# Patient Record
Sex: MALE | Race: WHITE | NOT HISPANIC OR LATINO | Employment: OTHER | URBAN - METROPOLITAN AREA
[De-identification: names, ages, dates, MRNs, and addresses within clinical notes are randomized per-mention and may not be internally consistent; named-entity substitution may affect disease eponyms.]

---

## 2017-01-11 ENCOUNTER — TELEPHONE (OUTPATIENT)
Dept: OTHER | Facility: CLINIC | Age: 72
End: 2017-01-11

## 2017-01-11 NOTE — TELEPHONE ENCOUNTER
What??? The [facility] Department of Rheumatology is NOT accepting new patients???? That sounds completely incorrect. Please call the scheduling department at the [facility] Rheumatology department to verify why the patient was told this.......

## 2017-01-11 NOTE — TELEPHONE ENCOUNTER
Patient states that when he called to schedule his Rheumatology appointment they stated that Rheum ([facility]) unable to accept new patients.  He needs another referral to a different Rheumatologist.  Please advise  Mica Keys RN, BSN

## 2017-01-11 NOTE — TELEPHONE ENCOUNTER
Called [facility] main referral line: [phone]  Patient's [facility] ID# [id number]  Fax sent to [facility] at , confirmed delivery via right fax at [phone]  May take up to seven days for approval or denial.  They will contact the patient if he is approved to go to [facility] and will contact the office if he is denied.  Patient updated  Mica Keys RN, BSN

## 2017-01-11 NOTE — TELEPHONE ENCOUNTER
I called and verified.  They are only accepting for scleroderma and lups right now.  Please advise

## 2017-01-13 ENCOUNTER — TELEPHONE (OUTPATIENT)
Dept: OTHER | Facility: CLINIC | Age: 72
End: 2017-01-13

## 2017-01-13 NOTE — TELEPHONE ENCOUNTER
Pt scheduled at [facility] on March 6th.  He wants to know if he should see PCP after that appointment or keep his February 1st appointment?  Please advise  Mica Keys, RN, BSN

## 2017-01-25 ENCOUNTER — HOSPITAL ENCOUNTER (OUTPATIENT)
Dept: LAB | Facility: CLINIC | Age: 72
Discharge: HOME | End: 2017-01-25
Attending: INTERNAL MEDICINE | Admitting: INTERNAL MEDICINE
Payer: MEDICARE

## 2017-01-25 DIAGNOSIS — I77.6 AORTITIS (H): ICD-10-CM

## 2017-01-25 LAB
CRP SERPL-MCNC: <2.9 MG/L (ref 0–8)
ERYTHROCYTE [SEDIMENTATION RATE] IN BLOOD BY WESTERGREN METHOD: 7 MM/H (ref 0–20)

## 2017-01-25 PROCEDURE — 36415 COLL VENOUS BLD VENIPUNCTURE: CPT | Performed by: INTERNAL MEDICINE

## 2017-01-25 PROCEDURE — 86140 C-REACTIVE PROTEIN: CPT | Performed by: INTERNAL MEDICINE

## 2017-01-25 PROCEDURE — 85652 RBC SED RATE AUTOMATED: CPT | Performed by: INTERNAL MEDICINE

## 2017-01-25 PROCEDURE — 86038 ANTINUCLEAR ANTIBODIES: CPT | Performed by: INTERNAL MEDICINE

## 2017-01-26 LAB — ANA SER QL IA: NORMAL

## 2017-02-09 ENCOUNTER — TELEPHONE (OUTPATIENT)
Dept: OTHER | Facility: CLINIC | Age: 72
End: 2017-02-09

## 2017-02-09 NOTE — TELEPHONE ENCOUNTER
Medication from patient assistance program delivered here.  Patient notified and will  tomorrow.  Mica Keys RN, BSN

## 2017-02-14 DIAGNOSIS — Z01.818 PRE-OPERATIVE EXAMINATION: Primary | ICD-10-CM

## 2017-02-14 PROCEDURE — 93010 ELECTROCARDIOGRAM REPORT: CPT | Mod: ZP | Performed by: INTERNAL MEDICINE

## 2017-02-15 ENCOUNTER — OFFICE VISIT (OUTPATIENT)
Dept: OTHER | Facility: CLINIC | Age: 72
End: 2017-02-15
Attending: INTERNAL MEDICINE
Payer: MEDICARE

## 2017-02-15 ENCOUNTER — HOSPITAL ENCOUNTER (OUTPATIENT)
Dept: LAB | Facility: CLINIC | Age: 72
Discharge: HOME | End: 2017-02-15
Attending: INTERNAL MEDICINE | Admitting: INTERNAL MEDICINE
Payer: MEDICARE

## 2017-02-15 VITALS
SYSTOLIC BLOOD PRESSURE: 135 MMHG | WEIGHT: 226 LBS | HEIGHT: 74 IN | BODY MASS INDEX: 29 KG/M2 | DIASTOLIC BLOOD PRESSURE: 78 MMHG

## 2017-02-15 DIAGNOSIS — Z72.0 TOBACCO ABUSE: ICD-10-CM

## 2017-02-15 DIAGNOSIS — Z01.818 PREOP GENERAL PHYSICAL EXAM: Primary | ICD-10-CM

## 2017-02-15 DIAGNOSIS — I10 ESSENTIAL HYPERTENSION: ICD-10-CM

## 2017-02-15 DIAGNOSIS — Z01.818 PRE-OPERATIVE EXAMINATION: ICD-10-CM

## 2017-02-15 LAB
APTT PPP: 34 SEC (ref 22–37)
HGB BLD-MCNC: 15.8 G/DL (ref 13.3–17.7)
INR PPP: 0.89 (ref 0.86–1.14)

## 2017-02-15 PROCEDURE — 99214 OFFICE O/P EST MOD 30 MIN: CPT | Mod: ZP | Performed by: INTERNAL MEDICINE

## 2017-02-15 PROCEDURE — 85730 THROMBOPLASTIN TIME PARTIAL: CPT | Performed by: INTERNAL MEDICINE

## 2017-02-15 PROCEDURE — 93010 ELECTROCARDIOGRAM REPORT: CPT | Mod: ZP | Performed by: INTERNAL MEDICINE

## 2017-02-15 PROCEDURE — 99211 OFF/OP EST MAY X REQ PHY/QHP: CPT

## 2017-02-15 PROCEDURE — 85610 PROTHROMBIN TIME: CPT | Performed by: INTERNAL MEDICINE

## 2017-02-15 PROCEDURE — 36415 COLL VENOUS BLD VENIPUNCTURE: CPT | Performed by: INTERNAL MEDICINE

## 2017-02-15 PROCEDURE — 85018 HEMOGLOBIN: CPT | Performed by: INTERNAL MEDICINE

## 2017-02-15 RX ORDER — LOSARTAN POTASSIUM 100 MG/1
100 TABLET ORAL DAILY
Qty: 90 TABLET | Refills: 3 | Status: SHIPPED | OUTPATIENT
Start: 2017-02-15 | End: 2017-06-06

## 2017-02-15 NOTE — MR AVS SNAPSHOT
After Visit Summary   2/15/2017    Kaleb Pennington    MRN: [id number]           Patient Information     Date Of Birth          1945        Visit Information        Provider Department      2/15/2017 2:00 PM Christopher Henry MD [facility]  [facility]      Today's Diagnoses     Preop general physical exam    -  1    Tobacco abuse        Essential hypertension          Care Instructions      Before Your Surgery      Call your surgeon if there is any change in your health. This includes signs of a cold or flu (such as a sore throat, runny nose, cough, rash or fever).    Do not smoke, drink alcohol or take over the counter medicine (unless your surgeon or primary care doctor tells you to) for the 24 hours before and after surgery.    If you take prescribed drugs: Follow your doctor s orders about which medicines to take and which to stop until after surgery.    Eating and drinking prior to surgery: follow the instructions from your surgeon    Take a shower or bath the night before surgery. Use the soap your surgeon gave you to gently clean your skin. If you do not have soap from your surgeon, use your regular soap. Do not shave or scrub the surgery site.  Wear clean pajamas and have clean sheets on your bed.         Follow-ups after your visit        Your next 10 appointments already scheduled     Feb 22, 2017   Procedure with Crystal Humphrey MD   [facility] (--)    [address]  [facility] [address]   [phone]            Mar 20, 2017 11:00 AM CDT   Return Visit with Christopher Henry MD   [facility] [place] [facility])    [address]  [facility] [address]   [phone]              Future tests that were ordered for you today     Open Future Orders        Priority Expected Expires Ordered    Partial  "thromboplastin time Routine 2017 3/14/2017 2017    INR Routine 2017 3/14/2017 2017    Hemoglobin Routine 2017 3/14/2017 2017            Who to contact     If you have questions or need follow up information about today's clinic visit or your schedule please contact [facility] directly at [phone].  Normal or non-critical lab and imaging results will be communicated to you by Offermaticahart, letter or phone within 4 business days after the clinic has received the results. If you do not hear from us within 7 days, please contact the clinic through [employer] or phone. If you have a critical or abnormal lab result, we will notify you by phone as soon as possible.  Submit refill requests through Toopher or call your pharmacy and they will forward the refill request to us. Please allow 3 business days for your refill to be completed.          Additional Information About Your Visit        OffermaticaharGate [employer] Information     Toopher lets you send messages to your doctor, view your test results, renew your prescriptions, schedule appointments and more. To sign up, go to [email or web address] . Click on \"Log in\" on the left side of the screen, which will take you to the Welcome page. Then click on \"Sign up Now\" on the right side of the page.     You will be asked to enter the access code listed below, as well as some personal information. Please follow the directions to create your username and password.     Your access code is: [email or web address]  Expires: 3/21/2017 10:58 AM     Your access code will  in 90 days. If you need help or a new code, please call your [place] clinic or [phone].        Care EveryWhere ID     This is your Care EveryWhere ID. This could be used by other organizations to access your [place] medical records  [id number]        Your Vitals Were     Height BMI (Body Mass Index)                6' 2\" (1.88 m) 29.02 kg/m2           Blood Pressure from Last 3 " Encounters:   02/15/17 135/78   12/21/16 172/80   09/14/16 128/75    Weight from Last 3 Encounters:   02/15/17 226 lb (102.5 kg)   12/21/16 215 lb (97.5 kg)   09/14/16 198 lb 3.2 oz (89.9 kg)              We Performed the Following     EKG 12-lead complete w/read - Clinics     HC SMOKING CESSATION COUNSELING, ASYMPTOMATIC, 3-10 MIN          Today's Medication Changes          These changes are accurate as of: 2/15/17  2:32 PM.  If you have any questions, ask your nurse or doctor.               These medicines have changed or have updated prescriptions.        Dose/Directions    losartan 100 MG tablet   Commonly known as:  COZAAR   This may have changed:    - medication strength  - how much to take   Used for:  Essential hypertension   Changed by:  Christopher Henry MD        Dose:  100 mg   Take 1 tablet (100 mg) by mouth daily   Quantity:  90 tablet   Refills:  3            Where to get your medicines      These medications were sent to [id number] - [place], [place] - [address] [facility] [address]  [address] [facility] [address], [facility] [address]     Phone:  [phone]     losartan 100 MG tablet                Primary Care Provider Office Phone # Fax #    Christopher Henry [phone]       [place] [facility] [address]  [facility] [address]        Thank you!     Thank you for choosing [facility] [place]  for your care. Our goal is always to provide you with excellent care. Hearing back from our patients is one way we can continue to improve our services. Please take a few minutes to complete the written survey that you may receive in the mail after your visit with us. Thank you!             Your Updated Medication List - Protect others around you: Learn how to safely use, store and throw away your medicines at www.disposemymeds.org.          This list is accurate as of: 2/15/17  2:32 PM.  Always use your most recent med list.                   Brand Name Dispense Instructions for  use    losartan 100 MG tablet    COZAAR    90 tablet    Take 1 tablet (100 mg) by mouth daily       metoprolol 25 MG tablet    LOPRESSOR    60 tablet    Take 1 tablet (25 mg) by mouth 2 times daily       predniSONE 10 MG tablet    DELTASONE    150 tablet    Take 1 tablet (10 mg) by mouth daily       * varenicline 0.5 MG X 11 & 1 MG X 42 tablet    CHANTIX STARTING MONTH VIET    53 tablet    Take 0.5 mg tab daily for 3 days, then 0.5 mg tab twice daily for 4 days, then 1 mg twice daily.       * varenicline 1 MG tablet    CHANTIX    56 tablet    Take 1 tablet (1 mg) by mouth 2 times daily       * varenicline 1 MG tablet    CHANTIX    180 tablet    Take 1 tablet (1 mg) by mouth 2 times daily       * varenicline 0.5 MG X 11 & 1 MG X 42 tablet    CHANTIX STARTING MONTH VIET    53 tablet    Take 0.5 mg tab daily for 3 days, then 0.5 mg tab twice daily for 4 days, then 1 mg twice daily.       * Notice:  This list has 4 medication(s) that are the same as other medications prescribed for you. Read the directions carefully, and ask your doctor or other care provider to review them with you.

## 2017-02-15 NOTE — PROGRESS NOTES
[facility]  [address]  [place] [address]  [phone]  Dept: [phone]    PRE-OP EVALUATION:  Today's date: 2/15/2017    Kaleb Pennington (: 1945) presents for pre-operative evaluation assessment as requested by Dr. Humphrey.  He requires evaluation and anesthesia risk assessment prior to undergoing surgery/procedure for treatment of a pancreatic mass .  Proposed procedure: EUS     Date of Surgery/ Procedure: 17  Time of Surgery/ Procedure: [facility]  Hospital/Surgical Facility: [facility]  Primary Physician: Christopher Henry  Type of Anesthesia Anticipated: Local with MAC    Patient has a Health Care Directive or Living Will:  YES Pt is full code        HPI:                                                      Brief HPI related to upcoming procedure: Pt was incidentally discovered to have a pancreatic mass. EUS is being undertaken to asisst in in definitive diagnosis      See problem list for active medical problems.  Problems all longstanding and stable, except as noted/documented.  See ROS for pertinent symptoms related to these conditions.                                                                                                  .    MEDICAL HISTORY:                                                    There are no active problems to display for this patient.     No past medical history on file.  Past Surgical History   Procedure Laterality Date     Orthopedic surgery       Current Outpatient Prescriptions   Medication Sig Dispense Refill     predniSONE (DELTASONE) 10 MG tablet Take 1 tablet (10 mg) by mouth daily 150 tablet 0     varenicline (CHANTIX) 1 MG tablet Take 1 tablet (1 mg) by mouth 2 times daily 180 tablet 0     losartan (COZAAR) 50 MG tablet Take 1 tablet (50 mg) by mouth daily 30 tablet 1     metoprolol (LOPRESSOR) 25 MG tablet Take 1 tablet (25 mg) by mouth 2 times daily 60 tablet 1     varenicline (CHANTIX STARTING MONTH )  "0.5 MG X 11 & 1 MG X 42 tablet Take 0.5 mg tab daily for 3 days, then 0.5 mg tab twice daily for 4 days, then 1 mg twice daily. 53 tablet 0     varenicline (CHANTIX STARTING MONTH VIET) 0.5 MG X 11 & 1 MG X 42 tablet Take 0.5 mg tab daily for 3 days, then 0.5 mg tab twice daily for 4 days, then 1 mg twice daily. 53 tablet 0     varenicline (CHANTIX) 1 MG tablet Take 1 tablet (1 mg) by mouth 2 times daily 56 tablet 2     OTC products: none    Allergies   Allergen Reactions     Bee Venom Anaphylaxis      Latex Allergy: NO    Social History   Substance Use Topics     Smoking status: Light Tobacco Smoker     Packs/day: 1.50     Years: 50.00     Types: Cigarettes     Smokeless tobacco: Not on file     Alcohol use No     History   Drug Use No       REVIEW OF SYSTEMS:                                                    C: NEGATIVE for fever, chills, change in weight  I: NEGATIVE for worrisome rashes, moles or lesions  E: NEGATIVE for vision changes or irritation  E/M: NEGATIVE for ear, mouth and throat problems  R: NEGATIVE for significant cough or SOB  B: NEGATIVE for masses, tenderness or discharge  CV: NEGATIVE for chest pain, palpitations or peripheral edema  GI: NEGATIVE for nausea, abdominal pain, heartburn, or change in bowel habits  : NEGATIVE for frequency, dysuria, or hematuria  MUSCULOSKELETAL:POSITIVE  for arthralgias in back and legs asosciated with aortitis  N: NEGATIVE for weakness, dizziness or paresthesias  E: NEGATIVE for temperature intolerance, skin/hair changes  H: NEGATIVE for bleeding problems  P: NEGATIVE for changes in mood or affect    EXAM:                                                    /78 (BP Location: Left arm, Patient Position: Chair, Cuff Size: Adult Regular)  Ht 6' 2\" (1.88 m)  Wt 226 lb (102.5 kg)  BMI 29.02 kg/m2    GENERAL APPEARANCE: healthy, alert and no distress     EYES: EOMI, - PERRL     HENT: ear canals and TM's normal and nose and mouth without ulcers or lesions     " NECK: no adenopathy, no asymmetry, masses, or scars and thyroid normal to palpation     RESP: lungs clear to auscultation - no rales, rhonchi or wheezes     CV: regular rates and rhythm, normal S1 S2, no S3 or S4 and no murmur, click or rub -     ABDOMEN:  soft, nontender, no HSM or masses and bowel sounds normal     MS: extremities normal- no gross deformities noted, no evidence of inflammation in joints, FROM in all extremities.     SKIN: no suspicious lesions or rashes     NEURO: Normal strength and tone, sensory exam grossly normal, mentation intact and speech normal     PSYCH: mentation appears normal. and affect normal/bright     LYMPHATICS: No axillary, cervical, inguinal, or supraclavicular nodes    DIAGNOSTICS:                                                    EKG: Normal Sinus Rhythm, LBBB, no LVH by voltage criteria  Coagulation Studies (e.g. INR, PTT, platelet count)    Recent Labs   Lab Test  02/15/17   1320  04/28/16   1204   HGB  15.8  13.7   PLT   --   386   INR  0.89   --    A1C   --   5.8        IMPRESSION:                                                    Reason for surgery/procedure: pancreatic mass  Diagnosis/reason for consult: aortitis    The proposed surgical procedure is considered LOW risk.    REVISED CARDIAC RISK INDEX  The patient has the following serious cardiovascular risks for perioperative complications such as (MI, PE, VFib and 3  AV Block):  No serious cardiac risks  INTERPRETATION: 0 risks: Class I (very low risk - 0.4% complication rate)    The patient has the following additional risks for perioperative complications:  No identified additional risks      ICD-10-CM    1. Preop general physical exam Z01.818 EKG 12-lead complete w/read - Clinics   2. Tobacco abuse Z72.0  SMOKING CESSATION COUNSELING, ASYMPTOMATIC, 3-10 MIN   3. Essential hypertension I10 losartan (COZAAR) 100 MG tablet       RECOMMENDATIONS:                                                      --Consult  hospital rounder / IM to assist post-op medical management    --Patient is to take all scheduled medications on the day of surgery.    --Losartan is increased from 50 to 100 mg daily due to persistent htn.        APPROVAL GIVEN to proceed with proposed procedure, without further diagnostic evaluation       Signed Electronically by: Christopher Henry MD    Copy of this evaluation report is provided to requesting physician.    [facility] Preop Guidelines

## 2017-02-15 NOTE — NURSING NOTE
"Chief Complaint   Patient presents with     Consult     pre-op; pt scheduled for an endoscopy 2/22/17       Initial /82 (BP Location: Left arm, Patient Position: Chair, Cuff Size: Adult Regular)  Ht 6' 2\" (1.88 m)  Wt 226 lb (102.5 kg)  BMI 29.02 kg/m2 Estimated body mass index is 29.02 kg/(m^2) as calculated from the following:    Height as of this encounter: 6' 2\" (1.88 m).    Weight as of this encounter: 226 lb (102.5 kg).  Medication Reconciliation: complete     Face to face nursing time: 10 minutes    Roxane Helms MA        "

## 2017-02-20 ENCOUNTER — TELEPHONE (OUTPATIENT)
Dept: OTHER | Facility: CLINIC | Age: 72
End: 2017-02-20

## 2017-02-20 NOTE — TELEPHONE ENCOUNTER
Pt would like to have an ordered for a hospital stay after procedure as he states that his insurance.  He would also like to know if a MRCP would be as good of a test then what he is scheduled for.  Please advise  Mica Keys RN, BSN

## 2017-02-20 NOTE — TELEPHONE ENCOUNTER
Patient will let RN know if order is needed for test or if current MD preforming procedure will order.  Awaiting return call.  Mica Keys, RN, BSN

## 2017-02-22 ENCOUNTER — TELEPHONE (OUTPATIENT)
Dept: OTHER | Facility: CLINIC | Age: 72
End: 2017-02-22

## 2017-02-22 DIAGNOSIS — F40.240 CLAUSTROPHOBIA: Primary | ICD-10-CM

## 2017-02-22 RX ORDER — LORAZEPAM 1 MG/1
TABLET ORAL
Qty: 1 TABLET | Refills: 0 | Status: SHIPPED | OUTPATIENT
Start: 2017-02-22 | End: 2017-06-06

## 2017-02-22 NOTE — TELEPHONE ENCOUNTER
Patient called, states understanding  Faxed RX February 22, 2017 to fax number     Right Fax confirmed at [phone]   Oma Keys

## 2017-02-22 NOTE — TELEPHONE ENCOUNTER
Pt called with [facility] that MRCP is scheduled for 3/3/17 at 1pm. Voice concerns about feeling claustrophobic in machine. Wondering if he should take a medication before procedure.     Mica Keys, RN, BSN

## 2017-03-03 ENCOUNTER — HOSPITAL ENCOUNTER (OUTPATIENT)
Dept: MRI IMAGING | Facility: CLINIC | Age: 72
Discharge: HOME | End: 2017-03-03
Attending: INTERNAL MEDICINE | Admitting: INTERNAL MEDICINE
Payer: MEDICARE

## 2017-03-03 DIAGNOSIS — K86.2 PANCREAS CYST: ICD-10-CM

## 2017-03-03 LAB
CREAT BLD-MCNC: 1 MG/DL (ref 0.66–1.25)
GFR SERPL CREATININE-BSD FRML MDRD: 73 ML/MIN/1.7M2

## 2017-03-03 PROCEDURE — 74183 MRI ABD W/O CNTR FLWD CNTR: CPT

## 2017-03-03 PROCEDURE — 82565 ASSAY OF CREATININE: CPT

## 2017-03-03 PROCEDURE — A9585 GADOBUTROL INJECTION: HCPCS | Performed by: INTERNAL MEDICINE

## 2017-03-03 PROCEDURE — 25500064 ZZH RX 255 OP 636: Performed by: INTERNAL MEDICINE

## 2017-03-03 PROCEDURE — 27210995 ZZH RX 272: Performed by: INTERNAL MEDICINE

## 2017-03-03 RX ORDER — ACYCLOVIR 200 MG/1
60 CAPSULE ORAL ONCE
Status: COMPLETED | OUTPATIENT
Start: 2017-03-03 | End: 2017-03-03

## 2017-03-03 RX ORDER — GADOBUTROL 604.72 MG/ML
15 INJECTION INTRAVENOUS ONCE
Status: COMPLETED | OUTPATIENT
Start: 2017-03-03 | End: 2017-03-03

## 2017-03-03 RX ADMIN — SODIUM CHLORIDE 60 ML: 9 INJECTION, SOLUTION INTRAMUSCULAR; INTRAVENOUS; SUBCUTANEOUS at 14:30

## 2017-03-03 RX ADMIN — GADOBUTROL 15 ML: 604.72 INJECTION INTRAVENOUS at 14:29

## 2017-03-08 ENCOUNTER — TRANSFERRED RECORDS (OUTPATIENT)
Dept: HEALTH INFORMATION MANAGEMENT | Facility: CLINIC | Age: 72
End: 2017-03-08

## 2017-03-13 ENCOUNTER — TRANSFERRED RECORDS (OUTPATIENT)
Dept: HEALTH INFORMATION MANAGEMENT | Facility: CLINIC | Age: 72
End: 2017-03-13

## 2017-03-17 ENCOUNTER — TRANSFERRED RECORDS (OUTPATIENT)
Dept: HEALTH INFORMATION MANAGEMENT | Facility: CLINIC | Age: 72
End: 2017-03-17

## 2017-03-17 ENCOUNTER — MEDICAL CORRESPONDENCE (OUTPATIENT)
Dept: HEALTH INFORMATION MANAGEMENT | Facility: CLINIC | Age: 72
End: 2017-03-17

## 2017-03-20 ENCOUNTER — TELEPHONE (OUTPATIENT)
Dept: OTHER | Facility: CLINIC | Age: 72
End: 2017-03-20

## 2017-03-20 NOTE — TELEPHONE ENCOUNTER
Patient would like to know if meds Rx'd by [facility] are ok with Dr. Henry and if he should continue taking all meds Rx'd by PCP  New list of meds from [facility] doc placed in Rm 12  Please advise  Mica Keys, YG, BSN

## 2017-04-04 ENCOUNTER — OFFICE VISIT (OUTPATIENT)
Dept: OTHER | Facility: CLINIC | Age: 72
End: 2017-04-04
Attending: INTERNAL MEDICINE
Payer: COMMERCIAL

## 2017-04-04 VITALS
HEART RATE: 77 BPM | DIASTOLIC BLOOD PRESSURE: 69 MMHG | WEIGHT: 231.4 LBS | BODY MASS INDEX: 29.71 KG/M2 | SYSTOLIC BLOOD PRESSURE: 128 MMHG

## 2017-04-04 DIAGNOSIS — R01.1 HEART MURMUR, SYSTOLIC: ICD-10-CM

## 2017-04-04 DIAGNOSIS — Z00.00 ROUTINE GENERAL MEDICAL EXAMINATION AT A HEALTH CARE FACILITY: ICD-10-CM

## 2017-04-04 DIAGNOSIS — R09.89 BRUIT OF RIGHT CAROTID ARTERY: ICD-10-CM

## 2017-04-04 DIAGNOSIS — K86.89 PANCREATIC MASS: Primary | ICD-10-CM

## 2017-04-04 DIAGNOSIS — I71.40 ABDOMINAL AORTIC ANEURYSM WITHOUT RUPTURE (H): ICD-10-CM

## 2017-04-04 DIAGNOSIS — I71.21 ASCENDING AORTIC ANEURYSM (H): ICD-10-CM
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
## 2017-04-04 DIAGNOSIS — Z72.0 TOBACCO ABUSE: ICD-10-CM

## 2017-04-04 PROCEDURE — 99215 OFFICE O/P EST HI 40 MIN: CPT | Mod: ZP | Performed by: INTERNAL MEDICINE

## 2017-04-04 PROCEDURE — 99211 OFF/OP EST MAY X REQ PHY/QHP: CPT

## 2017-04-04 PROCEDURE — 99406 BEHAV CHNG SMOKING 3-10 MIN: CPT | Mod: ZP | Performed by: INTERNAL MEDICINE

## 2017-04-04 PROCEDURE — 40000809 ZZH STATISTIC NO DOCUMENTATION TO SUPPORT CHARGE

## 2017-04-04 NOTE — MR AVS SNAPSHOT
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)        Tobacco abuse        Heart murmur, systolic, secondary to aortic sclerosis        Bruit of right carotid artery        Routine general medical examination at a health care facility        Wood tick bites           Follow-ups after your visit        Additional Services     Follow-Up with Vascular Medicine                 Your next 10 appointments already scheduled     May 30, 2017 10:30 AM CDT   US CAROTID BILATERAL with US5   [facility] ([facility])    [address] [facility] [address] [phone]           Please bring a list of your medicines (including vitamins, minerals and over-the-counter drugs). Also, tell your doctor about any allergies you may have. Wear comfortable clothes and leave your valuables at home.  You do not need to do anything special to prepare for your exam.  Please call the Imaging Department at your exam site with any questions.            Jun 06, 2017 10:30 AM CDT   Return Visit with Christopher Henry MD   [facility] ([facility])    [address]  [facility] [address] [phone]              Future tests that were ordered for you today     Open Future Orders        Priority Expected Expires Ordered    Follow-Up with Vascular Medicine Routine 6/4/2017 7/4/2017 4/4/2017    CBC with platelets differential Routine 6/4/2017 7/4/2017 4/4/2017    T3 Free Routine  "6/4/2017 7/4/2017 4/4/2017    T4 free Routine 6/4/2017 7/4/2017 4/4/2017    TSH Routine 6/4/2017 7/4/2017 4/4/2017    Basic metabolic panel Routine 6/4/2017 7/4/2017 4/4/2017    Hepatic panel Routine 6/4/2017 7/4/2017 4/4/2017    UA with Microscopic Routine 6/4/2017 7/4/2017 4/4/2017    Lipid Profile Routine 6/4/2017 7/4/2017 4/4/2017    Hemoglobin A1c Routine 6/4/2017 7/4/2017 4/4/2017    Albumin Random Urine Quantitative Routine 6/4/2017 7/4/2017 4/4/2017    Prostate spec antigen screen Routine 6/4/2017 7/4/2017 4/4/2017    Erythrocyte sedimentation rate auto Routine 6/4/2017 7/4/2017 4/4/2017    CRP inflammation Routine 6/4/2017 7/4/2017 4/4/2017    US Carotid Bilateral Routine 6/4/2017 7/4/2017 4/4/2017            Who to contact     If you have questions or need follow up information about today's clinic visit or your schedule please contact [facility]ASC[facility] [place] directly at [phone].  Normal or non-critical lab and imaging results will be communicated to you by Thrillhart, letter or phone within 4 business days after the clinic has received the results. If you do not hear from us within 7 days, please contact the clinic through Thrillhart or phone. If you have a critical or abnormal lab result, we will notify you by phone as soon as possible.  Submit refill requests through [email or web address] or call your pharmacy and they will forward the refill request to us. Please allow 3 business days for your refill to be completed.          Additional Information About Your Visit        [employer] Information     [email or web address] lets you send messages to your doctor, view your test results, renew your prescriptions, schedule appointments and more. To sign up, go to [email or web address] . Click on \"Log in\" on the left side of the screen, which will take you to the Welcome page. Then click on \"Sign up Now\" on the right side of the page.     You will be asked to enter the access code listed below, as well as some personal " information. Please follow the directions to create your username and password.     Your access code is: [email or web address]  Expires: 7/3/2017  2:14 PM     Your access code will  in 90 days. If you need help or a new code, please call your [place] clinic or [phone].        Care EveryWhere ID     This is your Care EveryWhere ID. This could be used by other organizations to access your [place] medical records  [id number]        Your Vitals Were     Pulse BMI (Body Mass Index)                77 29.71 kg/m2           Blood Pressure from Last 3 Encounters:   17 128/69   02/15/17 135/78   16 172/80    Weight from Last 3 Encounters:   17 231 lb 6.4 oz (105 kg)   02/15/17 226 lb (102.5 kg)   16 215 lb (97.5 kg)               Primary Care Provider Office Phone # Fax #    Christopher Henry [phone]       [place] [facility] [address]  [facility] [address]        Thank you!     Thank you for choosing [facility] VASCULAR [place]  for your care. Our goal is always to provide you with excellent care. Hearing back from our patients is one way we can continue to improve our services. Please take a few minutes to complete the written survey that you may receive in the mail after your visit with us. Thank you!             Your Updated Medication List - Protect others around you: Learn how to safely use, store and throw away your medicines at www.disposemymeds.org.          This list is accurate as of: 17  2:14 PM.  Always use your most recent med list.                   Brand Name Dispense Instructions for use    LORazepam 1 MG tablet    ATIVAN    1 tablet    One tab PO one hour prior to MRCP, do not drive while taking.       losartan 100 MG tablet    COZAAR    90 tablet    Take 1 tablet (100 mg) by mouth daily       metoprolol 25 MG tablet    LOPRESSOR    60 tablet    Take 1 tablet (25 mg) by mouth 2 times daily       predniSONE 10 MG tablet    DELTASONE     150 tablet    Take 1 tablet (10 mg) by mouth daily       * varenicline 1 MG tablet    CHANTIX    180 tablet    Take 1 tablet (1 mg) by mouth 2 times daily       * varenicline 0.5 MG X 11 & 1 MG X 42 tablet    CHANTIX STARTING MONTH VIET    53 tablet    Take 0.5 mg tab daily for 3 days, then 0.5 mg tab twice daily for 4 days, then 1 mg twice daily.       * Notice:  This list has 2 medication(s) that are the same as other medications prescribed for you. Read the directions carefully, and ask your doctor or other care provider to review them with you.

## 2017-04-04 NOTE — NURSING NOTE
"Chief Complaint   Patient presents with     RECHECK     BP check;lab results; review bernal notes       Initial /69 (BP Location: Right arm, Patient Position: Chair, Cuff Size: Adult Large)  Pulse 77  Wt 231 lb 6.4 oz (105 kg)  BMI 29.71 kg/m2 Estimated body mass index is 29.71 kg/(m^2) as calculated from the following:    Height as of 2/15/17: 6' 2\" (1.88 m).    Weight as of this encounter: 231 lb 6.4 oz (105 kg).  Medication Reconciliation: complete    "

## 2017-04-04 NOTE — PROGRESS NOTES
SUBJECTIVE:    CC: Kaleb Pennington is a 71 year old male who presents for:       Pancreatic mass  Ascending aortic aneurysm (H)  Abdominal aortic aneurysm without rupture (H)  Tobacco abuse  Heart murmur, systolic  Bruit of right carotid artery  Routine general medical examination at a health care facility  Wood tick bite          HISTORIES:    PROBLEM LIST:                 There is no problem list on file for this patient.      PAST MEDICAL HISTORY:                No past medical history on file.    PAST SURGICAL HISTORY:                  Past Surgical History:   Procedure Laterality Date     ORTHOPEDIC SURGERY         CURRENT MEDICATIONS:                  Current Outpatient Prescriptions   Medication Sig Dispense Refill     LORazepam (ATIVAN) 1 MG tablet One tab PO one hour prior to MRCP, do not drive while taking. 1 tablet 0     losartan (COZAAR) 100 MG tablet Take 1 tablet (100 mg) by mouth daily 90 tablet 3     predniSONE (DELTASONE) 10 MG tablet Take 1 tablet (10 mg) by mouth daily 150 tablet 0     varenicline (CHANTIX) 1 MG tablet Take 1 tablet (1 mg) by mouth 2 times daily 180 tablet 0     metoprolol (LOPRESSOR) 25 MG tablet Take 1 tablet (25 mg) by mouth 2 times daily 60 tablet 1     varenicline (CHANTIX STARTING MONTH VIET) 0.5 MG X 11 & 1 MG X 42 tablet Take 0.5 mg tab daily for 3 days, then 0.5 mg tab twice daily for 4 days, then 1 mg twice daily. 53 tablet 0       ALLERGIES:                  Allergies   Allergen Reactions     Bee Venom Anaphylaxis       SOCIAL HISTORY:                  Social History     Social History     Marital status: Single     Spouse name: N/A     Number of children: N/A     Years of education: N/A     Occupational History     Not on file.     Social History Main Topics     Smoking status: Light Tobacco Smoker     Packs/day: 1.50     Years: 50.00     Types: Cigarettes     Smokeless tobacco: Not on file     Alcohol use No     Drug use: No     Sexual activity: Not Currently      Partners: Female     Other Topics Concern     Not on file     Social History Narrative       FAMILY HISTORY:                   Family History   Problem Relation Age of Onset     CANCER Mother      CANCER Maternal Grandmother      CANCER Sister      CANCER Daughter                              REVIEW OF SYSTEMS:  CONSTITUTIONAL:no malaise, fatigue, or other general symptoms  EYES: no subjective changes in visual acuity, no photophobia  ENT/MOUTH: no complaints of rhinorrhea, nasal congestion, sore throat, hearing changes  RESP:no SOB  CV: no c/o exertional chest pressure or GARCIA  GI: No abdominal pain, constipation, change in bowel movements, nausea, pyrosis, BRBPR  :no polyuria or polydipsia, no dysuria, no gross hematuria  MUSCULOSKELATAL:POSITIVE  for back apin which continues on steroids and has not gotten better within 10 days of starting MTX  INTEGUMENTARY/SKIN: no pruritis, rashes, or moles with recent change in size, shape, or pigmentation  BREAST: no lumps, masses, or discharges  NEURO: no gross sensory or motor symptoms, no dizziness, no confusion  ENDOCRINE: no polyuria or polydipsia, no heat or cold intolerance  HEME/ALLERGY/IMMUNE: no fevers, chills, night sweats, or unwanted weight loss  PSYCHIATRIC: no depression, anxiety, or internal stimuli    EXAM:    /69 (BP Location: Right arm, Patient Position: Chair, Cuff Size: Adult Large)  Pulse 77  Wt 231 lb 6.4 oz (105 kg)  BMI 29.71 kg/m2  BMI: Body mass index is 29.71 kg/(m^2).  GENERAL APPEARANCE: healthy, alert and no distress   EXAM:  EYES: clear conjunctiva, no cataracts, no obvious fundoscopic abnormalities  HENT: oropharynx, nares, and TMs are WNL  NECK: no JVD, thyromegaly or lymphadenopathy, no cervical bruits  RESP: clear to auscultation without rales, wheezes, or rhonchi  CV: RRR, no murmurs, gallops, or rubs  LYMPH: no cervical , axillary, or inguinal lymphadenopathy appreciated  GI: NABS, ND/NT, no masses or organomegally  appreciated  MS: no obvoius clinicallly relevant arthropathy, no evidence vasculitis  SKIN: no nevi clinically suspicious for malignancy are noted  NEURO: CN II-XII intact, no localizing sensory or motor abnoramlities noted, DTRs symmetrical bilaterally  PSYCH: Mental status exam reveals the pt to have normal mood and affect. There is no disorder of thought form or content. There is no response to internal stimuli. There is no suicidal or homicidal ideation.             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)  Comment: whether this is aortitis, or an inflammatory aortic aneurysm is a moot point as his sxs got better with steroids, and are worsening with steroid withdrawal; as such I referred him to [facility] for a second rheumatologic opinion as to whether MTX would be appropriate and they concurred  Plan: Follow-Up with Vascular Medicine, Erythrocyte         sedimentation rate auto, CRP inflammation        Continue MTX through [facility], f/u AAA surveillance per [facility], may more appropriately be addressed with open repair as opposed to EVAR if uncertain regarding inflammatory component; open treatment would allow for a surgical path specimen (although utility of which is questionable as pt would have been on steroids and MTX) and would avoid complications of stenting an active inflammatory process    (Z72.0) Tobacco abuse  Comment: again...... He was told to STOP SMOKING. 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;   Plan:  Follow-Up with Vascular Medicine        5 minutes tobacco cessation counselling    (R01.1) Heart murmur, systolic, secondary to aortic sclerosis  Comment: this only aortic scelrosis  Plan: Follow-Up with Vascular Medicine           (R09.89) Bruit of right carotid artery  Comment: image in June  Plan: US Carotid Bilateral, Follow-Up with Vascular         Medicine            (Z00.00) Routine general medical examination at a health care facility  Comment: check the below  Plan: Follow-Up with Vascular Medicine, CBC with         platelets differential, T3 Free, T4 free, TSH,         Basic metabolic panel, Hepatic panel, UA with         Microscopic, Lipid Profile, Hemoglobin A1c,         Albumin Random Urine Quantitative, Prostate         spec antigen screen           (W57.XXXA) Wood tick bites  Comment: these are minimal and not infected  Plan: RTC prn        Greater than one half the forty minutes total spent on the pt's visit were spent providing education and counselling to the patient regarding the above matters. Extended time secondary to a patient who asks multiple questions, had to have a lengthy discussion re: tobacco cessation, as well as need to review outside records from [place].                         I have discussed with patient the risks, benefits, medications, treatment options and modalities.   I have instructed the patient to call or schedule a follow-up appointment if any problems or failure to improve.

## 2017-04-06 DIAGNOSIS — I77.6 VASCULITIS (H): Primary | ICD-10-CM

## 2017-04-17 ENCOUNTER — HOSPITAL ENCOUNTER (OUTPATIENT)
Dept: LAB | Facility: CLINIC | Age: 72
Discharge: HOME | End: 2017-04-17
Attending: INTERNAL MEDICINE | Admitting: INTERNAL MEDICINE
Payer: MEDICARE

## 2017-04-17 DIAGNOSIS — I77.6 VASCULITIS (H): ICD-10-CM

## 2017-04-17 LAB
AST SERPL W P-5'-P-CCNC: 20 U/L (ref 0–45)
BASOPHILS # BLD AUTO: 0 10E9/L (ref 0–0.2)
BASOPHILS NFR BLD AUTO: 0.1 %
CREAT SERPL-MCNC: 0.94 MG/DL (ref 0.66–1.25)
DIFFERENTIAL METHOD BLD: ABNORMAL
EOSINOPHIL # BLD AUTO: 0.1 10E9/L (ref 0–0.7)
EOSINOPHIL NFR BLD AUTO: 0.8 %
ERYTHROCYTE [DISTWIDTH] IN BLOOD BY AUTOMATED COUNT: 14.4 % (ref 10–15)
GFR SERPL CREATININE-BSD FRML MDRD: 78 ML/MIN/1.7M2
HCT VFR BLD AUTO: 44.7 % (ref 40–53)
HGB BLD-MCNC: 15.4 G/DL (ref 13.3–17.7)
IMM GRANULOCYTES # BLD: 0.1 10E9/L (ref 0–0.4)
IMM GRANULOCYTES NFR BLD: 0.7 %
LYMPHOCYTES # BLD AUTO: 1.5 10E9/L (ref 0.8–5.3)
LYMPHOCYTES NFR BLD AUTO: 13.2 %
MCH RBC QN AUTO: 32.9 PG (ref 26.5–33)
MCHC RBC AUTO-ENTMCNC: 34.5 G/DL (ref 31.5–36.5)
MCV RBC AUTO: 96 FL (ref 78–100)
MONOCYTES # BLD AUTO: 0.7 10E9/L (ref 0–1.3)
MONOCYTES NFR BLD AUTO: 6 %
NEUTROPHILS # BLD AUTO: 9.2 10E9/L (ref 1.6–8.3)
NEUTROPHILS NFR BLD AUTO: 79.2 %
NRBC # BLD AUTO: 0 10*3/UL
NRBC BLD AUTO-RTO: 0 /100
PLATELET # BLD AUTO: 223 10E9/L (ref 150–450)
RBC # BLD AUTO: 4.68 10E12/L (ref 4.4–5.9)
WBC # BLD AUTO: 11.7 10E9/L (ref 4–11)

## 2017-04-17 PROCEDURE — 84450 TRANSFERASE (AST) (SGOT): CPT | Performed by: INTERNAL MEDICINE

## 2017-04-17 PROCEDURE — 82565 ASSAY OF CREATININE: CPT | Performed by: INTERNAL MEDICINE

## 2017-04-17 PROCEDURE — 36415 COLL VENOUS BLD VENIPUNCTURE: CPT | Performed by: INTERNAL MEDICINE

## 2017-04-17 PROCEDURE — 85025 COMPLETE CBC W/AUTO DIFF WBC: CPT | Performed by: INTERNAL MEDICINE

## 2017-05-17 ENCOUNTER — HOSPITAL ENCOUNTER (OUTPATIENT)
Dept: LAB | Facility: CLINIC | Age: 72
Discharge: HOME | End: 2017-05-17
Attending: INTERNAL MEDICINE | Admitting: INTERNAL MEDICINE
Payer: MEDICARE

## 2017-05-17 DIAGNOSIS — I77.6 VASCULITIS (H): ICD-10-CM

## 2017-05-17 LAB
AST SERPL W P-5'-P-CCNC: 17 U/L (ref 0–45)
BASOPHILS # BLD AUTO: 0 10E9/L (ref 0–0.2)
BASOPHILS NFR BLD AUTO: 0.2 %
CREAT SERPL-MCNC: 0.93 MG/DL (ref 0.66–1.25)
DIFFERENTIAL METHOD BLD: ABNORMAL
EOSINOPHIL # BLD AUTO: 0.1 10E9/L (ref 0–0.7)
EOSINOPHIL NFR BLD AUTO: 0.9 %
ERYTHROCYTE [DISTWIDTH] IN BLOOD BY AUTOMATED COUNT: 15.3 % (ref 10–15)
GFR SERPL CREATININE-BSD FRML MDRD: 79 ML/MIN/1.7M2
HCT VFR BLD AUTO: 43.9 % (ref 40–53)
HGB BLD-MCNC: 14.9 G/DL (ref 13.3–17.7)
IMM GRANULOCYTES # BLD: 0.2 10E9/L (ref 0–0.4)
IMM GRANULOCYTES NFR BLD: 1.5 %
LYMPHOCYTES # BLD AUTO: 1.2 10E9/L (ref 0.8–5.3)
LYMPHOCYTES NFR BLD AUTO: 11.6 %
MCH RBC QN AUTO: 33.3 PG (ref 26.5–33)
MCHC RBC AUTO-ENTMCNC: 33.9 G/DL (ref 31.5–36.5)
MCV RBC AUTO: 98 FL (ref 78–100)
MONOCYTES # BLD AUTO: 0.8 10E9/L (ref 0–1.3)
MONOCYTES NFR BLD AUTO: 7.9 %
NEUTROPHILS # BLD AUTO: 8.3 10E9/L (ref 1.6–8.3)
NEUTROPHILS NFR BLD AUTO: 77.9 %
NRBC # BLD AUTO: 0 10*3/UL
NRBC BLD AUTO-RTO: 0 /100
PLATELET # BLD AUTO: 248 10E9/L (ref 150–450)
RBC # BLD AUTO: 4.48 10E12/L (ref 4.4–5.9)
WBC # BLD AUTO: 10.7 10E9/L (ref 4–11)

## 2017-05-17 PROCEDURE — 82565 ASSAY OF CREATININE: CPT | Performed by: INTERNAL MEDICINE

## 2017-05-17 PROCEDURE — 84450 TRANSFERASE (AST) (SGOT): CPT | Performed by: INTERNAL MEDICINE

## 2017-05-17 PROCEDURE — 85025 COMPLETE CBC W/AUTO DIFF WBC: CPT | Performed by: INTERNAL MEDICINE

## 2017-05-17 PROCEDURE — 36415 COLL VENOUS BLD VENIPUNCTURE: CPT | Performed by: INTERNAL MEDICINE

## 2017-05-24 ENCOUNTER — TELEPHONE (OUTPATIENT)
Dept: OTHER | Facility: CLINIC | Age: 72
End: 2017-05-24

## 2017-05-24 NOTE — TELEPHONE ENCOUNTER
Patient request for chantix refill    Last Written Prescription Date: 12/21/16  Last Fill Quantity: 180, # refills: 0  Last Office Visit with G, P or [facility] prescribing provider: 4/4/17   Next 5 appointments (look out 90 days)     Jun 06, 2017 10:30 AM CDT   Return Visit with Christopher Henry MD   [facility] ([facility])    [address] . [address]  [facility] [address]   [phone]                   BP Readings from Last 3 Encounters:   04/04/17 128/69   02/15/17 135/78   12/21/16 172/80     Refill done per RN refill protocol  Order placed through [employer] assistance program   Patient # [id number]  Order placed for 2 boxes of 1.0mg tablets  Will be sent to Office,   Order Number [id number]  Mica Keys RN, BSN

## 2017-05-30 ENCOUNTER — HOSPITAL ENCOUNTER (OUTPATIENT)
Dept: LAB | Facility: CLINIC | Age: 72
End: 2017-05-30
Attending: INTERNAL MEDICINE
Payer: MEDICARE

## 2017-05-30 ENCOUNTER — HOSPITAL ENCOUNTER (OUTPATIENT)
Dept: ULTRASOUND IMAGING | Facility: CLINIC | Age: 72
Discharge: HOME | End: 2017-05-30
Attending: INTERNAL MEDICINE | Admitting: INTERNAL MEDICINE
Payer: MEDICARE

## 2017-05-30 DIAGNOSIS — R09.89 BRUIT OF RIGHT CAROTID ARTERY: ICD-10-CM

## 2017-05-30 DIAGNOSIS — I77.6 VASCULITIS (H): ICD-10-CM

## 2017-05-30 DIAGNOSIS — I71.40 ABDOMINAL AORTIC ANEURYSM WITHOUT RUPTURE (H): ICD-10-CM

## 2017-05-30 DIAGNOSIS — Z00.00 ROUTINE GENERAL MEDICAL EXAMINATION AT A HEALTH CARE FACILITY: ICD-10-CM

## 2017-05-30 LAB
ALBUMIN SERPL-MCNC: 3.4 G/DL (ref 3.4–5)
ALBUMIN UR-MCNC: 10 MG/DL
ALP SERPL-CCNC: 72 U/L (ref 40–150)
ALT SERPL W P-5'-P-CCNC: 29 U/L (ref 0–70)
ANION GAP SERPL CALCULATED.3IONS-SCNC: 6 MMOL/L (ref 3–14)
APPEARANCE UR: CLEAR
AST SERPL W P-5'-P-CCNC: 25 U/L (ref 0–45)
BASOPHILS # BLD AUTO: 0 10E9/L (ref 0–0.2)
BASOPHILS NFR BLD AUTO: 0.2 %
BILIRUB DIRECT SERPL-MCNC: 0.1 MG/DL (ref 0–0.2)
BILIRUB SERPL-MCNC: 0.4 MG/DL (ref 0.2–1.3)
BILIRUB UR QL STRIP: NEGATIVE
BUN SERPL-MCNC: 15 MG/DL (ref 7–30)
CALCIUM SERPL-MCNC: 8.5 MG/DL (ref 8.5–10.1)
CHLORIDE SERPL-SCNC: 105 MMOL/L (ref 94–109)
CHOLEST SERPL-MCNC: 167 MG/DL
CO2 SERPL-SCNC: 27 MMOL/L (ref 20–32)
COLOR UR AUTO: YELLOW
CREAT SERPL-MCNC: 0.86 MG/DL (ref 0.66–1.25)
CREAT UR-MCNC: 291 MG/DL
CRP SERPL-MCNC: 3.6 MG/L (ref 0–8)
DIFFERENTIAL METHOD BLD: ABNORMAL
EOSINOPHIL # BLD AUTO: 0.1 10E9/L (ref 0–0.7)
EOSINOPHIL NFR BLD AUTO: 1 %
ERYTHROCYTE [DISTWIDTH] IN BLOOD BY AUTOMATED COUNT: 15.2 % (ref 10–15)
ERYTHROCYTE [SEDIMENTATION RATE] IN BLOOD BY WESTERGREN METHOD: 10 MM/H (ref 0–20)
GFR SERPL CREATININE-BSD FRML MDRD: 87 ML/MIN/1.7M2
GLUCOSE SERPL-MCNC: 107 MG/DL (ref 70–99)
GLUCOSE UR STRIP-MCNC: NEGATIVE MG/DL
HBA1C MFR BLD: 6.2 % (ref 4.3–6)
HCT VFR BLD AUTO: 41.2 % (ref 40–53)
HDLC SERPL-MCNC: 50 MG/DL
HGB BLD-MCNC: 14 G/DL (ref 13.3–17.7)
HGB UR QL STRIP: NEGATIVE
IMM GRANULOCYTES # BLD: 0.1 10E9/L (ref 0–0.4)
IMM GRANULOCYTES NFR BLD: 0.6 %
KETONES UR STRIP-MCNC: 5 MG/DL
LDLC SERPL CALC-MCNC: 103 MG/DL
LEUKOCYTE ESTERASE UR QL STRIP: NEGATIVE
LYMPHOCYTES # BLD AUTO: 0.9 10E9/L (ref 0.8–5.3)
LYMPHOCYTES NFR BLD AUTO: 9 %
MCH RBC QN AUTO: 33.5 PG (ref 26.5–33)
MCHC RBC AUTO-ENTMCNC: 34 G/DL (ref 31.5–36.5)
MCV RBC AUTO: 99 FL (ref 78–100)
MICROALBUMIN UR-MCNC: 16 MG/L
MICROALBUMIN/CREAT UR: 5.57 MG/G CR (ref 0–17)
MONOCYTES # BLD AUTO: 0.5 10E9/L (ref 0–1.3)
MONOCYTES NFR BLD AUTO: 5.3 %
MUCOUS THREADS #/AREA URNS LPF: PRESENT /LPF
NEUTROPHILS # BLD AUTO: 8.3 10E9/L (ref 1.6–8.3)
NEUTROPHILS NFR BLD AUTO: 83.9 %
NITRATE UR QL: NEGATIVE
NONHDLC SERPL-MCNC: 117 MG/DL
NRBC # BLD AUTO: 0 10*3/UL
NRBC BLD AUTO-RTO: 0 /100
PH UR STRIP: 6 PH (ref 5–7)
PLATELET # BLD AUTO: 231 10E9/L (ref 150–450)
POTASSIUM SERPL-SCNC: 3.9 MMOL/L (ref 3.4–5.3)
PROT SERPL-MCNC: 6.3 G/DL (ref 6.8–8.8)
PSA SERPL-ACNC: 0.44 UG/L (ref 0–4)
RBC # BLD AUTO: 4.18 10E12/L (ref 4.4–5.9)
RBC #/AREA URNS AUTO: 2 /HPF (ref 0–2)
SODIUM SERPL-SCNC: 138 MMOL/L (ref 133–144)
SP GR UR STRIP: 1.03 (ref 1–1.03)
SQUAMOUS #/AREA URNS AUTO: <1 /HPF (ref 0–1)
T3FREE SERPL-MCNC: 2.3 PG/ML (ref 2.3–4.2)
T4 FREE SERPL-MCNC: 0.83 NG/DL (ref 0.76–1.46)
TRIGL SERPL-MCNC: 72 MG/DL
TSH SERPL DL<=0.05 MIU/L-ACNC: 1.13 MU/L (ref 0.4–4)
URN SPEC COLLECT METH UR: ABNORMAL
UROBILINOGEN UR STRIP-MCNC: 2 MG/DL (ref 0–2)
WBC # BLD AUTO: 9.9 10E9/L (ref 4–11)
WBC #/AREA URNS AUTO: 1 /HPF (ref 0–2)

## 2017-05-30 PROCEDURE — 84481 FREE ASSAY (FT-3): CPT | Performed by: INTERNAL MEDICINE

## 2017-05-30 PROCEDURE — 82043 UR ALBUMIN QUANTITATIVE: CPT | Performed by: INTERNAL MEDICINE

## 2017-05-30 PROCEDURE — 84439 ASSAY OF FREE THYROXINE: CPT | Performed by: INTERNAL MEDICINE

## 2017-05-30 PROCEDURE — 80061 LIPID PANEL: CPT | Performed by: INTERNAL MEDICINE

## 2017-05-30 PROCEDURE — 82565 ASSAY OF CREATININE: CPT | Performed by: INTERNAL MEDICINE

## 2017-05-30 PROCEDURE — G0103 PSA SCREENING: HCPCS | Performed by: INTERNAL MEDICINE

## 2017-05-30 PROCEDURE — 85652 RBC SED RATE AUTOMATED: CPT | Performed by: INTERNAL MEDICINE

## 2017-05-30 PROCEDURE — 85025 COMPLETE CBC W/AUTO DIFF WBC: CPT | Performed by: INTERNAL MEDICINE

## 2017-05-30 PROCEDURE — 80048 BASIC METABOLIC PNL TOTAL CA: CPT | Performed by: INTERNAL MEDICINE

## 2017-05-30 PROCEDURE — 81001 URINALYSIS AUTO W/SCOPE: CPT | Performed by: INTERNAL MEDICINE

## 2017-05-30 PROCEDURE — 93880 EXTRACRANIAL BILAT STUDY: CPT

## 2017-05-30 PROCEDURE — 83036 HEMOGLOBIN GLYCOSYLATED A1C: CPT | Performed by: INTERNAL MEDICINE

## 2017-05-30 PROCEDURE — 36415 COLL VENOUS BLD VENIPUNCTURE: CPT | Performed by: INTERNAL MEDICINE

## 2017-05-30 PROCEDURE — 86140 C-REACTIVE PROTEIN: CPT | Performed by: INTERNAL MEDICINE

## 2017-05-30 PROCEDURE — 84443 ASSAY THYROID STIM HORMONE: CPT | Performed by: INTERNAL MEDICINE

## 2017-05-30 PROCEDURE — 80076 HEPATIC FUNCTION PANEL: CPT | Performed by: INTERNAL MEDICINE

## 2017-06-06 ENCOUNTER — OFFICE VISIT (OUTPATIENT)
Dept: OTHER | Facility: CLINIC | Age: 72
End: 2017-06-06
Attending: INTERNAL MEDICINE
Payer: COMMERCIAL

## 2017-06-06 VITALS
DIASTOLIC BLOOD PRESSURE: 72 MMHG | SYSTOLIC BLOOD PRESSURE: 142 MMHG | HEART RATE: 71 BPM | OXYGEN SATURATION: 96 % | WEIGHT: 235 LBS | BODY MASS INDEX: 30.17 KG/M2

## 2017-06-06 DIAGNOSIS — Z00.00 MEDICARE ANNUAL WELLNESS VISIT, SUBSEQUENT: Primary | ICD-10-CM

## 2017-06-06 DIAGNOSIS — R73.01 IFG (IMPAIRED FASTING GLUCOSE): ICD-10-CM

## 2017-06-06 DIAGNOSIS — R09.89 BRUIT OF RIGHT CAROTID ARTERY: ICD-10-CM

## 2017-06-06 DIAGNOSIS — K86.89 PANCREATIC MASS: ICD-10-CM

## 2017-06-06 DIAGNOSIS — I71.40 ABDOMINAL AORTIC ANEURYSM WITHOUT RUPTURE (H): ICD-10-CM

## 2017-06-06 DIAGNOSIS — E78.5 HYPERLIPIDEMIA LDL GOAL <100: ICD-10-CM

## 2017-06-06 DIAGNOSIS — I10 ESSENTIAL HYPERTENSION: ICD-10-CM

## 2017-06-06 DIAGNOSIS — Z72.0 TOBACCO ABUSE: ICD-10-CM

## 2017-06-06 DIAGNOSIS — I71.21 ASCENDING AORTIC ANEURYSM (H): ICD-10-CM

## 2017-06-06 PROCEDURE — 99211 OFF/OP EST MAY X REQ PHY/QHP: CPT

## 2017-06-06 PROCEDURE — G0438 PPPS, INITIAL VISIT: HCPCS | Mod: ZP | Performed by: INTERNAL MEDICINE

## 2017-06-06 PROCEDURE — 99213 OFFICE O/P EST LOW 20 MIN: CPT | Mod: ZP | Performed by: INTERNAL MEDICINE

## 2017-06-06 RX ORDER — LOSARTAN POTASSIUM 100 MG/1
100 TABLET ORAL DAILY
Qty: 90 TABLET | Refills: 3 | Status: SHIPPED | OUTPATIENT
Start: 2017-06-06 | End: 2017-09-11

## 2017-06-06 RX ORDER — ATORVASTATIN CALCIUM 40 MG/1
40 TABLET, FILM COATED ORAL DAILY
Qty: 90 TABLET | Refills: 3 | Status: SHIPPED | OUTPATIENT
Start: 2017-06-06 | End: 2017-09-11

## 2017-06-06 NOTE — PROGRESS NOTES
Kaleb Pennington is a 72 year old male who presents for:       Medicare annual wellness visit, subsequent  Pancreatic mass  Ascending aortic aneurysm (H)  Abdominal aortic aneurysm without rupture (H)  Tobacco abuse  Bruit of right carotid artery  Essential hypertension  IFG (impaired fasting glucose)  Hyperlipidemia LDL goal <100      Current providers caring for this patient include:  Patient Care Team:  Christopher Henry MD as PCP - General (Internal Medicine)    Complete Medical and Social history reviewed with patient, outlined below.    There is no problem list on file for this patient.      No past medical history on file.    Past Surgical History:   Procedure Laterality Date     ORTHOPEDIC SURGERY         Family History   Problem Relation Age of Onset     CANCER Mother      CANCER Maternal Grandmother      CANCER Sister      CANCER Daughter        Social History   Substance Use Topics     Smoking status: Light Tobacco Smoker     Packs/day: 1.50     Years: 50.00     Types: Cigarettes     Smokeless tobacco: Never Used     Alcohol use No       Diet: regular, low salt/low fat  Physical Activity: active without specific exercise program  Depression Screen:    Over the past 2 weeks, patient has felt down, depressed, or hopeless:  No    Over the past 2 weeks, patient has felt little interest or pleasure in doing things: No    Functional ability/Safety screen:  Up and go test (able to get up and walk longer than 30 seconds): Passed  Patient needs assistance with: nothing  Patient's home has the following possible safety concerns: none identified  Patient has concerns about his hearing:  No  Cognitive Screen  Patient repeats three objects (ball, flag, tree)      Clock drawing test:   NORMAL  Recalls three objects after 3 minutes (ball,flag,tree):                                                                                               recalls 3 objects (3 points)    Review Of Systems  Skin:  negative  Eyes: negative  Ears/Nose/Throat: negative  Respiratory: No shortness of breath, dyspnea on exertion, cough, or hemoptysis  Cardiovascular: negative  Gastrointestinal: negative  Genitourinary: negative  Musculoskeletal: positive for left lower back pain  Neurologic: negative  Psychiatric: negative  Hematologic/Lymphatic/Immunologic: negative  Endocrine: negative     PAST MEDICAL HISTORY:                No past medical history on file.    PAST SURGICAL HISTORY:                  Past Surgical History:   Procedure Laterality Date     ORTHOPEDIC SURGERY         CURRENT MEDICATIONS:                  Current Outpatient Prescriptions   Medication Sig Dispense Refill     FOLIC ACID PO Take 1 mg by mouth daily       Methotrexate Sodium (METHOTREXATE PO) Take 6 tablets by mouth once a week       predniSONE (DELTASONE) 10 MG tablet Take 1 tablet (10 mg) by mouth daily 150 tablet 0     varenicline (CHANTIX) 1 MG tablet Take 1 tablet (1 mg) by mouth 2 times daily 180 tablet 0     varenicline (CHANTIX STARTING MONTH VIET) 0.5 MG X 11 & 1 MG X 42 tablet Take 0.5 mg tab daily for 3 days, then 0.5 mg tab twice daily for 4 days, then 1 mg twice daily. 53 tablet 0       ALLERGIES:                  Allergies   Allergen Reactions     Bee Venom Anaphylaxis       SOCIAL HISTORY:                  Social History     Social History     Marital status: Single     Spouse name: N/A     Number of children: N/A     Years of education: N/A     Occupational History     Not on file.     Social History Main Topics     Smoking status: Light Tobacco Smoker     Packs/day: 1.50     Years: 50.00     Types: Cigarettes     Smokeless tobacco: Never Used     Alcohol use No     Drug use: No     Sexual activity: Not Currently     Partners: Female     Other Topics Concern     Not on file     Social History Narrative       FAMILY HISTORY:                   Family History   Problem Relation Age of Onset     CANCER Mother      CANCER Maternal Grandmother       CANCER Sister      CANCER Daughter          Physical Exam:  /72 (BP Location: Right arm, Patient Position: Chair, Cuff Size: Adult Large)  Pulse 71  Wt 235 lb (106.6 kg)  SpO2 96%  BMI 30.17 kg/m2   Body mass index is 30.17 kg/(m^2).                GENERAL APPEARANCE: healthy, alert and no distress  PSYCH: Affect normal/bright.  Mentation within normal limits.  EYES: conjunctiva clear  HENT: ear canals and TM's normal.  Nose and mouth without ulcers, erythema or lesions  NECK: supple, nontender, no lymphadenopathy  RESP: lungs clear to auscultation - no rales, rhonchi or wheezes  CV: regular rates and rhythm, normal S1 S2, no murmur noted  ABDOMEN:  soft, nontender, no HSM or masses and bowel sounds normal  SKIN: no suspicious lesions or rashes  NEURO: Normal strength and tone,  normal speech and mentation  Extremities: no peripheral edema or tenderness, peripheral pulses normal  MS: extremities normal- no gross deformities noted, no erythema, FROM noted in all extremities  PSYCH: mentation appears normal  LYMPHATICS: normal ant/post cervical, supraclavicular, and axillary nodes    End of Life Planning:   Patient currently has an advanced directive: Yes.  Practitioner is supportive of decision.    Education/Counseling:   Based on review of the above information, the following items were addressed:      Tobacco abuse - cessation advised, support offered    Appropriate preventive services were discussed with this patient, including applicable screening as appropriate for cardiovascular disease, diabetes, osteopenia/osteoporosis, and glaucoma.  As appropriate for age/gender, discussed screening for colorectal cancer, prostate cancer, breast cancer, and cervical cancer.   Checklist reviewing preventive services available has been given to the patient.        (Z00.00) Medicare annual wellness visit, subsequent  (primary encounter diagnosis)  Comment: RTC one year for annual wellness exam ; he is due for a  colonosocpy but I would prefer to have the isuse with his aorta better treated prior to proceeding to this, will re assess this when seen in f/u in six months  Plan: Full Code            (K86.9) Pancreatic mass  (primary encounter diagnosis)  Comment: he is to have a f/u EUS and MRI this month through [facility]  Plan: Follow-Up with Vascular Medicine         (I71.2) 37 mm Ascending aortic aneurysm (H)  Comment: [facility] records reviewed, per their measurements, it is slightly larger than the above  Plan: Follow-Up with Vascular Medicine        F/u surveillance per [facility], no intervention presently warranted     (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)  Comment: whether this is aortitis, or an inflammatory aortic aneurysm is a moot point as his sxs got better with steroids, and are worsening with steroid withdrawal; as such I referred him to [facility] for a second rheumatologic opinion as to whether MTX would be appropriate and they concurred  Plan: Follow-Up with Vascular Medicine, Erythrocyte         sedimentation rate auto, CRP inflammation        Continue MTX through [facility], f/u AAA surveillance per [facility], may more appropriately be addressed with open repair as opposed to EVAR if uncertain regarding inflammatory component; open treatment would allow for a surgical path specimen (although utility of which is questionable as pt would have been on steroids and MTX) and would avoid complications of stenting an active inflammatory process     (Z72.0) Tobacco abuse  Comment: again...... He was told to STOP SMOKING. 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;   Plan: Follow-Up with Vascular Medicine; I informed him I will terminate care with him on 1-1-18 if he continues to smoke; he understands and agrees        5 minutes tobacco cessation counselling      (R09.89) Bruit of right carotid artery  Comment: this is minimally stenosed   Plan: OFFICE/OUTPT  VISIT,PHUONG WARE III        Repeat carotid doppler in three years    (I10) Essential hypertension  Comment: not at goal, he mistakenly stopped taking losartan; he will have losartan 100 mg daily resumed  Plan: OFFICE/OUTPT VISIT,[facility] WARE III            (R73.01) IFG (impaired fasting glucose)  Comment: this is being impacted by his steroid use, he is told to coordinate weaning that down by the MDs at [facility]  Plan: recheck labs in three months    (E78.5) Hyperlipidemia LDL goal <100  Comment: not at goal, start the below  Plan: atorvastatin (LIPITOR) 40 MG tablet        recheck labs in three months            Greater than one half the 15 minutes not spent on preventive care during this visit was spent providing aducation and counselling regarding item(s) # 2 onward as delineated above.

## 2017-06-06 NOTE — MR AVS SNAPSHOT
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  Patient should be prepared to give a urine specimen  Please do not eat 10-12 hours before your appointment if you are coming in fasting for labs on lipids, cholesterol, or glucose (sugar).  Pregnant women should follow their Care Team instructions. Water with medications is okay. Do not drink coffee or other fluids.   If you have concerns about taking  your medications, please ask at office or if scheduling via [employer], send a message by clicking on Secure Messaging, Message Your Care Team.            Jul 05, 2017  1:30 PM CDT   Return Visit with Christopher Henry MD   [facility] [place] ([facility])    [address]  [place] [address]   [phone]            Jul 17, 2017 10:30 AM CDT   LAB with  LAB ONLY   [facility] ([facility])    [address] [place] [address]   [phone]           Patient must bring  "picture ID.  Patient should be prepared to give a urine specimen  Please do not eat 10-12 hours before your appointment if you are coming in fasting for labs on lipids, cholesterol, or glucose (sugar).  Pregnant women should follow their Care Team instructions. Water with medications is okay. Do not drink coffee or other fluids.   If you have concerns about taking  your medications, please ask at office or if scheduling via [employer], send a message by clicking on Secure Messaging, Message Your Care Team.              Who to contact     If you have questions or need follow up information about today's clinic visit or your schedule please contact [facility] directly at [phone].  Normal or non-critical lab and imaging results will be communicated to you by Xspandhart, letter or phone within 4 business days after the clinic has received the results. If you do not hear from us within 7 days, please contact the clinic through [employer] or phone. If you have a critical or abnormal lab result, we will notify you by phone as soon as possible.  Submit refill requests through [employer] or call your pharmacy and they will forward the refill request to us. Please allow 3 business days for your refill to be completed.          Additional Information About Your Visit        Xspandhart Information     [employer] lets you send messages to your doctor, view your test results, renew your prescriptions, schedule appointments and more. To sign up, go to [email or web address] . Click on \"Log in\" on the left side of the screen, which will take you to the Welcome page. Then click on \"Sign up Now\" on the right side of the page.     You will be asked to enter the access code listed below, as well as some personal information. Please follow the directions to create your username and password.     Your access code is: [email or web address]  Expires: 7/3/2017  2:14 PM     Your access code will  in 90 days. If you need help or a " new code, please call your [place] clinic or [phone].        Care EveryWhere ID     This is your Care EveryWhere ID. This could be used by other organizations to access your [facility] medical records  [id number]        Your Vitals Were     Pulse Pulse Oximetry BMI (Body Mass Index)             71 96% 30.17 kg/m2          Blood Pressure from Last 3 Encounters:   06/06/17 142/72   04/04/17 128/69   02/15/17 135/78    Weight from Last 3 Encounters:   06/06/17 235 lb (106.6 kg)   04/04/17 231 lb 6.4 oz (105 kg)   02/15/17 226 lb (102.5 kg)              We Performed the Following     Follow-Up with Vascular Medicine     Full Code     HC SMOKING CESSATION COUNSELING, ASYMPTOMATIC, 3-10 MIN     OFFICE/OUTPT VISIT,EST,LEVL III          Today's Medication Changes          These changes are accurate as of: 6/6/17 11:25 AM.  If you have any questions, ask your nurse or doctor.               Start taking these medicines.        Dose/Directions    atorvastatin 40 MG tablet   Commonly known as:  LIPITOR   Used for:  Hyperlipidemia LDL goal <100   Started by:  Christopher Henry MD        Dose:  40 mg   Take 1 tablet (40 mg) by mouth daily   Quantity:  90 tablet   Refills:  3            Where to get your medicines      These medications were sent to [id number] - [place], [place] - [address]  [address] [address], [facility] [address]     Phone:  [phone]     atorvastatin 40 MG tablet    losartan 100 MG tablet                Primary Care Provider Office Phone # Fax #    Christopher Henry [phone]       [place] [facility] [address] [facility]  [place] [address]        Thank you!     Thank you for choosing [facility] [place]  for your care. Our goal is always to provide you with excellent care. Hearing back from our patients is one way we can continue to improve our services. Please take a few minutes to complete the written survey that you may receive in the mail after  your visit with us. Thank you!             Your Updated Medication List - Protect others around you: Learn how to safely use, store and throw away your medicines at www.disposemymeds.org.          This list is accurate as of: 6/6/17 11:25 AM.  Always use your most recent med list.                   Brand Name Dispense Instructions for use    atorvastatin 40 MG tablet    LIPITOR    90 tablet    Take 1 tablet (40 mg) by mouth daily       FOLIC ACID PO      Take 1 mg by mouth daily       losartan 100 MG tablet    COZAAR    90 tablet    Take 1 tablet (100 mg) by mouth daily       METHOTREXATE PO      Take 6 tablets by mouth once a week       predniSONE 10 MG tablet    DELTASONE    150 tablet    Take 1 tablet (10 mg) by mouth daily       * varenicline 1 MG tablet    CHANTIX    180 tablet    Take 1 tablet (1 mg) by mouth 2 times daily       * varenicline 0.5 MG X 11 & 1 MG X 42 tablet    CHANTIX STARTING MONTH VIET    53 tablet    Take 0.5 mg tab daily for 3 days, then 0.5 mg tab twice daily for 4 days, then 1 mg twice daily.       * Notice:  This list has 2 medication(s) that are the same as other medications prescribed for you. Read the directions carefully, and ask your doctor or other care provider to review them with you.

## 2017-06-06 NOTE — NURSING NOTE
"Chief Complaint   Patient presents with     RECHECK     Follow up labs and US       Initial /72 (BP Location: Right arm, Patient Position: Chair, Cuff Size: Adult Large)  Pulse 71  Wt 235 lb (106.6 kg)  SpO2 96%  BMI 30.17 kg/m2 Estimated body mass index is 30.17 kg/(m^2) as calculated from the following:    Height as of 2/15/17: 6' 2\" (1.88 m).    Weight as of this encounter: 235 lb (106.6 kg).  Medication Reconciliation: complete     Face to face nursing time: 8 minutes    Antoinette Neumann MA     "

## 2017-07-05 ENCOUNTER — TRANSFERRED RECORDS (OUTPATIENT)
Dept: OTHER | Facility: CLINIC | Age: 72
End: 2017-07-05

## 2017-07-05 ENCOUNTER — OFFICE VISIT (OUTPATIENT)
Dept: OTHER | Facility: CLINIC | Age: 72
End: 2017-07-05
Attending: INTERNAL MEDICINE
Payer: COMMERCIAL

## 2017-07-05 VITALS
HEART RATE: 79 BPM | DIASTOLIC BLOOD PRESSURE: 68 MMHG | BODY MASS INDEX: 30.04 KG/M2 | SYSTOLIC BLOOD PRESSURE: 128 MMHG | WEIGHT: 234 LBS

## 2017-07-05 DIAGNOSIS — I10 ESSENTIAL HYPERTENSION: ICD-10-CM

## 2017-07-05 DIAGNOSIS — I71.21 ASCENDING AORTIC ANEURYSM (H): ICD-10-CM

## 2017-07-05 DIAGNOSIS — Z72.0 TOBACCO ABUSE: ICD-10-CM

## 2017-07-05 DIAGNOSIS — I71.40 ABDOMINAL AORTIC ANEURYSM WITHOUT RUPTURE (H): Primary | ICD-10-CM

## 2017-07-05 DIAGNOSIS — N43.3 HYDROCELE, UNSPECIFIED HYDROCELE TYPE: ICD-10-CM

## 2017-07-05 PROCEDURE — 99214 OFFICE O/P EST MOD 30 MIN: CPT | Mod: ZP | Performed by: INTERNAL MEDICINE

## 2017-07-05 PROCEDURE — 99406 BEHAV CHNG SMOKING 3-10 MIN: CPT | Mod: ZP | Performed by: INTERNAL MEDICINE

## 2017-07-05 PROCEDURE — 99211 OFF/OP EST MAY X REQ PHY/QHP: CPT

## 2017-07-05 RX ORDER — METOPROLOL TARTRATE 25 MG/1
12.5 TABLET, FILM COATED ORAL 2 TIMES DAILY
Qty: 90 TABLET | Refills: 3 | Status: SHIPPED | OUTPATIENT
Start: 2017-07-05 | End: 2017-09-11

## 2017-07-05 NOTE — NURSING NOTE
"Chief Complaint   Patient presents with     RECHECK     1 month BP recheck       Initial /74 (BP Location: Right arm, Patient Position: Chair, Cuff Size: Adult Regular)  Pulse 79  Wt 234 lb (106.1 kg)  BMI 30.04 kg/m2 Estimated body mass index is 30.04 kg/(m^2) as calculated from the following:    Height as of 2/15/17: 6' 2\" (1.88 m).    Weight as of this encounter: 234 lb (106.1 kg).  Medication Reconciliation: complete   Face to face time: 5 min  Arlene Davis MA      "

## 2017-07-05 NOTE — MR AVS SNAPSHOT
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  Patient should be prepared to give a urine specimen  Please do not eat 10-12 hours before your appointment if you are coming in fasting for labs on lipids, cholesterol, or glucose (sugar).  Pregnant women should follow their Care Team instructions. Water with medications is okay. Do not drink coffee or other fluids.   If you have concerns about taking  your medications, please ask at office or if scheduling via [employer], send a message by clicking on Secure Messaging, Message Your Care Team.            Sep 01, 2017 10:00 AM CDT   LAB with  LAB ONLY   [facility] ([facility])    [address] [place] [address]   [phone]           Patient must bring picture ID.  Patient should be prepared to give a urine specimen  Please do not eat 10-12 hours before your appointment if you are coming in fasting for labs on lipids, cholesterol, or glucose (sugar).  Pregnant women should follow their Care Team instructions. Water with medications is okay. Do not drink coffee or other fluids.   If you have concerns about taking  your medications, please  "ask at office or if scheduling via [employer], send a message by clicking on Secure Messaging, Message Your Care Team.            Sep 11, 2017 10:00 AM CDT   (Arrive by 9:45 AM)   Return Visit with Christopher Henry MD   [facility] ([facility])    [address]  [place] [address] [phone]              Who to contact     If you have questions or need follow up information about today's clinic visit or your schedule please contact [facility] [place] directly at [phone].  Normal or non-critical lab and imaging results will be communicated to you by MyChart, letter or phone within 4 business days after the clinic has received the results. If you do not hear from us within 7 days, please contact the clinic through [employer] or phone. If you have a critical or abnormal lab result, we will notify you by phone as soon as possible.  Submit refill requests through [employer] or call your pharmacy and they will forward the refill request to us. Please allow 3 business days for your refill to be completed.          Additional Information About Your Visit        Attune Information     [employer] lets you send messages to your doctor, view your test results, renew your prescriptions, schedule appointments and more. To sign up, go to [email or web address] . Click on \"Log in\" on the left side of the screen, which will take you to the Welcome page. Then click on \"Sign up Now\" on the right side of the page.     You will be asked to enter the access code listed below, as well as some personal information. Please follow the directions to create your username and password.     Your access code is: [email or web address]  Expires: 10/3/2017  1:56 PM     Your access code will  in 90 days. If you need help or a new code, please call your [place] clinic or [phone].        Care EveryWhere ID     This is your Care EveryWhere ID. " This could be used by other organizations to access your [facility] medical records  [id number]        Your Vitals Were     Pulse BMI (Body Mass Index)                79 30.04 kg/m2           Blood Pressure from Last 3 Encounters:   07/05/17 128/68   06/06/17 142/72   04/04/17 128/69    Weight from Last 3 Encounters:   07/05/17 234 lb (106.1 kg)   06/06/17 235 lb (106.6 kg)   04/04/17 231 lb 6.4 oz (105 kg)              We Performed the Following     HC SMOKING CESSATION COUNSELING, ASYMPTOMATIC, 3-10 MIN          Today's Medication Changes          These changes are accurate as of: 7/5/17  1:56 PM.  If you have any questions, ask your nurse or doctor.               Start taking these medicines.        Dose/Directions    metoprolol 25 MG tablet   Commonly known as:  LOPRESSOR   Used for:  Essential hypertension   Started by:  Christopher Henry MD        Dose:  12.5 mg   Take 0.5 tablets (12.5 mg) by mouth 2 times daily   Quantity:  90 tablet   Refills:  3         These medicines have changed or have updated prescriptions.        Dose/Directions    predniSONE 10 MG tablet   Commonly known as:  DELTASONE   This may have changed:  how much to take   Used for:  Aortitis (H)        Dose:  10 mg   Take 1 tablet (10 mg) by mouth daily   Quantity:  150 tablet   Refills:  0            Where to get your medicines      These medications were sent to [id number] - [place], [place] - [facility]  [address] [address], [facility] [address]     Phone:  [phone]     metoprolol 25 MG tablet                Primary Care Provider Office Phone # Fax #    Christopher Henry [phone]       [place] [facility] [address]  [place] [address]        Equal Access to Services     JAD BENITEZ AH: Hadii marlene El, watravonda luqadaha, qaybta kaalmada jessica, mayela araya. So [facility] [phone].    ATENCIÓN: Si habla español, tiene a ramirez disposición servicios  demario de asistencia lingüística. Dillan mcmahon [phone].    We comply with applicable federal civil rights laws and [place] laws. We do not discriminate on the basis of race, color, national origin, age, disability sex, sexual orientation or gender identity.            Thank you!     Thank you for choosing [facility] [place]  for your care. Our goal is always to provide you with excellent care. Hearing back from our patients is one way we can continue to improve our services. Please take a few minutes to complete the written survey that you may receive in the mail after your visit with us. Thank you!             Your Updated Medication List - Protect others around you: Learn how to safely use, store and throw away your medicines at www.disposemymeds.org.          This list is accurate as of: 7/5/17  1:56 PM.  Always use your most recent med list.                   Brand Name Dispense Instructions for use Diagnosis    atorvastatin 40 MG tablet    LIPITOR    90 tablet    Take 1 tablet (40 mg) by mouth daily    Hyperlipidemia LDL goal <100, Medicare annual wellness visit, subsequent, Pancreatic mass, Ascending aortic aneurysm (H), Abdominal aortic aneurysm without rupture (H), Tobacco abuse, Bruit of right carotid artery, Essential hypertension, IFG (impaired fasting glucose)       FOLIC ACID PO      Take 1 mg by mouth daily        losartan 100 MG tablet    COZAAR    90 tablet    Take 1 tablet (100 mg) by mouth daily    Essential hypertension, Medicare annual wellness visit, subsequent, Pancreatic mass, Ascending aortic aneurysm (H), Abdominal aortic aneurysm without rupture (H), Tobacco abuse, Bruit of right carotid artery, IFG (impaired fasting glucose), Hyperlipidemia LDL goal <100       METHOTREXATE PO      Take 6 tablets by mouth once a week        metoprolol 25 MG tablet    LOPRESSOR    90 tablet    Take 0.5 tablets (12.5 mg) by mouth 2 times daily    Essential hypertension       predniSONE  10 MG tablet    DELTASONE    150 tablet    Take 1 tablet (10 mg) by mouth daily    Aortitis (H)       * varenicline 1 MG tablet    CHANTIX    180 tablet    Take 1 tablet (1 mg) by mouth 2 times daily    Tobacco abuse       * varenicline 0.5 MG X 11 & 1 MG X 42 tablet    CHANTIX STARTING MONTH VIET    53 tablet    Take 0.5 mg tab daily for 3 days, then 0.5 mg tab twice daily for 4 days, then 1 mg twice daily.    Tobacco abuse       * Notice:  This list has 2 medication(s) that are the same as other medications prescribed for you. Read the directions carefully, and ask your doctor or other care provider to review them with you.

## 2017-07-05 NOTE — PROGRESS NOTES
Kaleb Pennington is a 72 year old male who is presenting at the current time to discuss his diagnosi(es) of:       Abdominal aortic aneurysm without rupture (H)  Essential hypertension  Tobacco abuse  Ascending aortic aneurysm (H)  Hydrocele, unspecified hydrocele type    Review Of Systems  Skin: negative  Eyes: negative  Ears/Nose/Throat: negative  Respiratory: No shortness of breath, dyspnea on exertion, cough, or hemoptysis  Cardiovascular: negative  Gastrointestinal: negative  Genitourinary: negative  Musculoskeletal: positive for back pain and leg pain  Neurologic: negative  Psychiatric: negative  Hematologic/Lymphatic/Immunologic: negative  Endocrine: negative     PAST MEDICAL HISTORY:                No past medical history on file.    PAST SURGICAL HISTORY:                  Past Surgical History:   Procedure Laterality Date     ORTHOPEDIC SURGERY         CURRENT MEDICATIONS:                  Current Outpatient Prescriptions   Medication Sig Dispense Refill     metoprolol (LOPRESSOR) 25 MG tablet Take 0.5 tablets (12.5 mg) by mouth 2 times daily 90 tablet 3     atorvastatin (LIPITOR) 40 MG tablet Take 1 tablet (40 mg) by mouth daily 90 tablet 3     losartan (COZAAR) 100 MG tablet Take 1 tablet (100 mg) by mouth daily 90 tablet 3     FOLIC ACID PO Take 1 mg by mouth daily       Methotrexate Sodium (METHOTREXATE PO) Take 6 tablets by mouth once a week       predniSONE (DELTASONE) 10 MG tablet Take 1 tablet (10 mg) by mouth daily (Patient taking differently: Take 5 mg by mouth daily ) 150 tablet 0     varenicline (CHANTIX) 1 MG tablet Take 1 tablet (1 mg) by mouth 2 times daily 180 tablet 0     varenicline (CHANTIX STARTING MONTH VIET) 0.5 MG X 11 & 1 MG X 42 tablet Take 0.5 mg tab daily for 3 days, then 0.5 mg tab twice daily for 4 days, then 1 mg twice daily. 53 tablet 0       ALLERGIES:                  Allergies   Allergen Reactions     Bee Venom Anaphylaxis       SOCIAL HISTORY:                  Social  History     Social History     Marital status: Single     Spouse name: N/A     Number of children: N/A     Years of education: N/A     Occupational History     Not on file.     Social History Main Topics     Smoking status: Light Tobacco Smoker     Packs/day: 1.50     Years: 50.00     Types: Cigarettes     Smokeless tobacco: Never Used     Alcohol use No     Drug use: No     Sexual activity: Not Currently     Partners: Female     Other Topics Concern     Not on file     Social History Narrative       FAMILY HISTORY:                   Family History   Problem Relation Age of Onset     CANCER Mother      CANCER Maternal Grandmother      CANCER Sister      CANCER Daughter          Physical exam Reveals:    O/P: WNL  HEENT: WNL  NECK: No JVD, thyromegaly, or lymphadenopathy  HEART: RRR, no murmurs, gallops, or rubs  LUNGS: CTA bilaterally without rales, wheezes, or rhonchi  GI: NABS, nondistended, nontender, soft  EXT:without cyanosis, clubbing, or edema  NEURO: nonfocal  : no flank tenderness      A/P:    (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  Plan: start beta blocker    (N43.3) Hydrocele, unspecified hydrocele type  Comment: this is uncomplicated appearing   Plan: reassurtance

## 2017-07-14 NOTE — TELEPHONE ENCOUNTER
Chantix     Last Written Prescription Date: 5/24/17  Last Fill Quantity: 2 56 count boxes, # refills: 0  Last Office Visit with [facility], P or [facility] prescribing provider: 7/5/17   Next 5 appointments (look out 90 days)     Sep 11, 2017 10:00 AM CDT   (Arrive by 9:45 AM)   Return Visit with Christopher Henry MD   [facility] ([facility])    [address]  [facility] [address]   [phone]                   BP Readings from Last 3 Encounters:   07/05/17 128/68   06/06/17 142/72   04/04/17 128/69     Refill done per RN refill protocol  Order placed through [employer] assistance program   Patient # [id number]  Order placed for 2 boxes of 1.0mg tablets  Will be sent to Office,   Order [id number]  Mica Keys RN, BSN

## 2017-07-17 ENCOUNTER — HOSPITAL ENCOUNTER (OUTPATIENT)
Dept: LAB | Facility: CLINIC | Age: 72
Discharge: HOME | End: 2017-07-17
Attending: INTERNAL MEDICINE | Admitting: INTERNAL MEDICINE
Payer: MEDICARE

## 2017-07-17 DIAGNOSIS — I77.6 VASCULITIS (H): ICD-10-CM

## 2017-07-17 LAB
AST SERPL W P-5'-P-CCNC: 27 U/L (ref 0–45)
BASOPHILS # BLD AUTO: 0 10E9/L (ref 0–0.2)
BASOPHILS NFR BLD AUTO: 0.2 %
CREAT SERPL-MCNC: 0.9 MG/DL (ref 0.66–1.25)
DIFFERENTIAL METHOD BLD: ABNORMAL
EOSINOPHIL # BLD AUTO: 0.4 10E9/L (ref 0–0.7)
EOSINOPHIL NFR BLD AUTO: 3.7 %
ERYTHROCYTE [DISTWIDTH] IN BLOOD BY AUTOMATED COUNT: 14.5 % (ref 10–15)
GFR SERPL CREATININE-BSD FRML MDRD: 83 ML/MIN/1.7M2
HCT VFR BLD AUTO: 41.6 % (ref 40–53)
HGB BLD-MCNC: 14.3 G/DL (ref 13.3–17.7)
IMM GRANULOCYTES # BLD: 0 10E9/L (ref 0–0.4)
IMM GRANULOCYTES NFR BLD: 0.4 %
LYMPHOCYTES # BLD AUTO: 1.2 10E9/L (ref 0.8–5.3)
LYMPHOCYTES NFR BLD AUTO: 13.1 %
MCH RBC QN AUTO: 34.2 PG (ref 26.5–33)
MCHC RBC AUTO-ENTMCNC: 34.4 G/DL (ref 31.5–36.5)
MCV RBC AUTO: 100 FL (ref 78–100)
MONOCYTES # BLD AUTO: 0.6 10E9/L (ref 0–1.3)
MONOCYTES NFR BLD AUTO: 6.5 %
NEUTROPHILS # BLD AUTO: 7.2 10E9/L (ref 1.6–8.3)
NEUTROPHILS NFR BLD AUTO: 76.1 %
NRBC # BLD AUTO: 0 10*3/UL
NRBC BLD AUTO-RTO: 0 /100
PLATELET # BLD AUTO: 247 10E9/L (ref 150–450)
RBC # BLD AUTO: 4.18 10E12/L (ref 4.4–5.9)
WBC # BLD AUTO: 9.4 10E9/L (ref 4–11)

## 2017-07-17 PROCEDURE — 84450 TRANSFERASE (AST) (SGOT): CPT | Performed by: INTERNAL MEDICINE

## 2017-07-17 PROCEDURE — 82565 ASSAY OF CREATININE: CPT | Performed by: INTERNAL MEDICINE

## 2017-07-17 PROCEDURE — 85025 COMPLETE CBC W/AUTO DIFF WBC: CPT | Performed by: INTERNAL MEDICINE

## 2017-07-17 PROCEDURE — 36415 COLL VENOUS BLD VENIPUNCTURE: CPT | Performed by: INTERNAL MEDICINE

## 2017-07-27 ENCOUNTER — TELEPHONE (OUTPATIENT)
Dept: OTHER | Facility: CLINIC | Age: 72
End: 2017-07-27

## 2017-07-27 NOTE — TELEPHONE ENCOUNTER
LM for patient that Chantix arrived.  He was instructed to call for a  time  Awaiting return call  Mica Keys RN, BSN

## 2017-09-01 ENCOUNTER — HOSPITAL ENCOUNTER (OUTPATIENT)
Dept: LAB | Facility: CLINIC | Age: 72
Discharge: HOME | End: 2017-09-01
Attending: INTERNAL MEDICINE | Admitting: INTERNAL MEDICINE
Payer: MEDICARE

## 2017-09-01 DIAGNOSIS — I71.40 ABDOMINAL AORTIC ANEURYSM WITHOUT RUPTURE (H): ICD-10-CM

## 2017-09-01 DIAGNOSIS — Z72.0 TOBACCO ABUSE: ICD-10-CM

## 2017-09-01 DIAGNOSIS — K86.89 PANCREATIC MASS: ICD-10-CM

## 2017-09-01 DIAGNOSIS — R73.01 IFG (IMPAIRED FASTING GLUCOSE): ICD-10-CM

## 2017-09-01 DIAGNOSIS — Z00.00 MEDICARE ANNUAL WELLNESS VISIT, SUBSEQUENT: ICD-10-CM

## 2017-09-01 DIAGNOSIS — E78.5 HYPERLIPIDEMIA LDL GOAL <100: ICD-10-CM

## 2017-09-01 DIAGNOSIS — I10 ESSENTIAL HYPERTENSION: ICD-10-CM

## 2017-09-01 DIAGNOSIS — R09.89 BRUIT OF RIGHT CAROTID ARTERY: ICD-10-CM

## 2017-09-01 DIAGNOSIS — I71.21 ASCENDING AORTIC ANEURYSM (H): ICD-10-CM

## 2017-09-01 LAB
ALBUMIN SERPL-MCNC: 3.7 G/DL (ref 3.4–5)
ALP SERPL-CCNC: 107 U/L (ref 40–150)
ALT SERPL W P-5'-P-CCNC: 34 U/L (ref 0–70)
ANION GAP SERPL CALCULATED.3IONS-SCNC: 8 MMOL/L (ref 3–14)
AST SERPL W P-5'-P-CCNC: 19 U/L (ref 0–45)
BASOPHILS # BLD AUTO: 0 10E9/L (ref 0–0.2)
BASOPHILS NFR BLD AUTO: 0.4 %
BILIRUB DIRECT SERPL-MCNC: 0.1 MG/DL (ref 0–0.2)
BILIRUB SERPL-MCNC: 0.4 MG/DL (ref 0.2–1.3)
BUN SERPL-MCNC: 14 MG/DL (ref 7–30)
CALCIUM SERPL-MCNC: 8.9 MG/DL (ref 8.5–10.1)
CHLORIDE SERPL-SCNC: 104 MMOL/L (ref 94–109)
CHOLEST SERPL-MCNC: 115 MG/DL
CO2 SERPL-SCNC: 26 MMOL/L (ref 20–32)
CREAT SERPL-MCNC: 0.95 MG/DL (ref 0.66–1.25)
CREAT UR-MCNC: 217 MG/DL
CRP SERPL-MCNC: <2.9 MG/L (ref 0–8)
DEPRECATED CALCIDIOL+CALCIFEROL SERPL-MC: 16 UG/L (ref 20–75)
DIFFERENTIAL METHOD BLD: ABNORMAL
EOSINOPHIL # BLD AUTO: 0.2 10E9/L (ref 0–0.7)
EOSINOPHIL NFR BLD AUTO: 2.6 %
ERYTHROCYTE [DISTWIDTH] IN BLOOD BY AUTOMATED COUNT: 13.8 % (ref 10–15)
ERYTHROCYTE [SEDIMENTATION RATE] IN BLOOD BY WESTERGREN METHOD: 9 MM/H (ref 0–20)
GFR SERPL CREATININE-BSD FRML MDRD: 78 ML/MIN/1.7M2
GLUCOSE SERPL-MCNC: 91 MG/DL (ref 70–99)
HBA1C MFR BLD: 6 % (ref 4.3–6)
HCT VFR BLD AUTO: 43.1 % (ref 40–53)
HDLC SERPL-MCNC: 37 MG/DL
HGB BLD-MCNC: 14.7 G/DL (ref 13.3–17.7)
IMM GRANULOCYTES # BLD: 0 10E9/L (ref 0–0.4)
IMM GRANULOCYTES NFR BLD: 0.5 %
LDLC SERPL CALC-MCNC: 61 MG/DL
LYMPHOCYTES # BLD AUTO: 1.1 10E9/L (ref 0.8–5.3)
LYMPHOCYTES NFR BLD AUTO: 13.5 %
MCH RBC QN AUTO: 34.3 PG (ref 26.5–33)
MCHC RBC AUTO-ENTMCNC: 34.1 G/DL (ref 31.5–36.5)
MCV RBC AUTO: 101 FL (ref 78–100)
MICROALBUMIN UR-MCNC: 20 MG/L
MICROALBUMIN/CREAT UR: 9.22 MG/G CR (ref 0–17)
MONOCYTES # BLD AUTO: 0.9 10E9/L (ref 0–1.3)
MONOCYTES NFR BLD AUTO: 10.7 %
NEUTROPHILS # BLD AUTO: 6 10E9/L (ref 1.6–8.3)
NEUTROPHILS NFR BLD AUTO: 72.3 %
NONHDLC SERPL-MCNC: 78 MG/DL
NRBC # BLD AUTO: 0 10*3/UL
NRBC BLD AUTO-RTO: 0 /100
PLATELET # BLD AUTO: 272 10E9/L (ref 150–450)
POTASSIUM SERPL-SCNC: 4.1 MMOL/L (ref 3.4–5.3)
PROT SERPL-MCNC: 6.9 G/DL (ref 6.8–8.8)
RBC # BLD AUTO: 4.29 10E12/L (ref 4.4–5.9)
SODIUM SERPL-SCNC: 138 MMOL/L (ref 133–144)
T4 FREE SERPL-MCNC: 0.83 NG/DL (ref 0.76–1.46)
TRIGL SERPL-MCNC: 86 MG/DL
TSH SERPL DL<=0.005 MIU/L-ACNC: 1.23 MU/L (ref 0.4–4)
TSH SERPL DL<=0.005 MIU/L-ACNC: 1.23 MU/L (ref 0.4–4)
WBC # BLD AUTO: 8.2 10E9/L (ref 4–11)

## 2017-09-01 PROCEDURE — 86140 C-REACTIVE PROTEIN: CPT | Performed by: INTERNAL MEDICINE

## 2017-09-01 PROCEDURE — 80048 BASIC METABOLIC PNL TOTAL CA: CPT | Performed by: INTERNAL MEDICINE

## 2017-09-01 PROCEDURE — 84439 ASSAY OF FREE THYROXINE: CPT | Performed by: INTERNAL MEDICINE

## 2017-09-01 PROCEDURE — 83036 HEMOGLOBIN GLYCOSYLATED A1C: CPT | Performed by: INTERNAL MEDICINE

## 2017-09-01 PROCEDURE — 80076 HEPATIC FUNCTION PANEL: CPT | Performed by: INTERNAL MEDICINE

## 2017-09-01 PROCEDURE — 36415 COLL VENOUS BLD VENIPUNCTURE: CPT | Performed by: INTERNAL MEDICINE

## 2017-09-01 PROCEDURE — 84443 ASSAY THYROID STIM HORMONE: CPT | Performed by: INTERNAL MEDICINE

## 2017-09-01 PROCEDURE — 85025 COMPLETE CBC W/AUTO DIFF WBC: CPT | Performed by: INTERNAL MEDICINE

## 2017-09-01 PROCEDURE — 85652 RBC SED RATE AUTOMATED: CPT | Performed by: INTERNAL MEDICINE

## 2017-09-01 PROCEDURE — 82043 UR ALBUMIN QUANTITATIVE: CPT | Performed by: INTERNAL MEDICINE

## 2017-09-01 PROCEDURE — 82306 VITAMIN D 25 HYDROXY: CPT | Performed by: INTERNAL MEDICINE

## 2017-09-01 PROCEDURE — 80061 LIPID PANEL: CPT | Performed by: INTERNAL MEDICINE

## 2017-09-11 ENCOUNTER — OFFICE VISIT (OUTPATIENT)
Dept: OTHER | Facility: CLINIC | Age: 72
End: 2017-09-11
Attending: INTERNAL MEDICINE
Payer: COMMERCIAL

## 2017-09-11 VITALS
RESPIRATION RATE: 12 BRPM | WEIGHT: 232.6 LBS | OXYGEN SATURATION: 97 % | BODY MASS INDEX: 29.85 KG/M2 | DIASTOLIC BLOOD PRESSURE: 62 MMHG | HEART RATE: 52 BPM | SYSTOLIC BLOOD PRESSURE: 128 MMHG | HEIGHT: 74 IN

## 2017-09-11 DIAGNOSIS — E78.5 HYPERLIPIDEMIA LDL GOAL <100: ICD-10-CM

## 2017-09-11 DIAGNOSIS — I71.40 ABDOMINAL AORTIC ANEURYSM WITHOUT RUPTURE (H): ICD-10-CM

## 2017-09-11 DIAGNOSIS — Z72.0 TOBACCO ABUSE: ICD-10-CM

## 2017-09-11 DIAGNOSIS — Z12.5 SCREENING FOR PROSTATE CANCER: Primary | ICD-10-CM

## 2017-09-11 DIAGNOSIS — R09.89 BRUIT OF RIGHT CAROTID ARTERY: ICD-10-CM

## 2017-09-11 DIAGNOSIS — I71.21 ASCENDING AORTIC ANEURYSM (H): ICD-10-CM

## 2017-09-11 DIAGNOSIS — K86.89 PANCREATIC MASS: ICD-10-CM

## 2017-09-11 DIAGNOSIS — I10 ESSENTIAL HYPERTENSION: ICD-10-CM

## 2017-09-11 DIAGNOSIS — R73.01 IFG (IMPAIRED FASTING GLUCOSE): ICD-10-CM

## 2017-09-11 PROCEDURE — 99214 OFFICE O/P EST MOD 30 MIN: CPT | Mod: ZP | Performed by: INTERNAL MEDICINE

## 2017-09-11 PROCEDURE — 99211 OFF/OP EST MAY X REQ PHY/QHP: CPT

## 2017-09-11 PROCEDURE — 99406 BEHAV CHNG SMOKING 3-10 MIN: CPT | Mod: ZP | Performed by: INTERNAL MEDICINE

## 2017-09-11 RX ORDER — ATORVASTATIN CALCIUM 40 MG/1
40 TABLET, FILM COATED ORAL DAILY
Qty: 90 TABLET | Refills: 3 | Status: SHIPPED | OUTPATIENT
Start: 2017-09-11 | End: 2018-11-19

## 2017-09-11 RX ORDER — LOSARTAN POTASSIUM 100 MG/1
100 TABLET ORAL DAILY
Qty: 90 TABLET | Refills: 3 | Status: SHIPPED | OUTPATIENT
Start: 2017-09-11 | End: 2018-11-19

## 2017-09-11 RX ORDER — VARENICLINE TARTRATE 1 MG/1
1 TABLET, FILM COATED ORAL 2 TIMES DAILY
Qty: 180 TABLET | Refills: 0 | Status: SHIPPED | OUTPATIENT
Start: 2017-09-11 | End: 2018-02-02

## 2017-09-11 RX ORDER — METOPROLOL TARTRATE 25 MG/1
12.5 TABLET, FILM COATED ORAL 2 TIMES DAILY
Qty: 90 TABLET | Refills: 3 | Status: SHIPPED | OUTPATIENT
Start: 2017-09-11 | End: 2018-08-24

## 2017-09-11 NOTE — NURSING NOTE
"Chief Complaint   Patient presents with     RECHECK     labs       Initial /70 (BP Location: Right arm, Patient Position: Chair, Cuff Size: Adult Regular)  Pulse 52  Ht 6' 2\" (1.88 m)  Wt 232 lb 9.6 oz (105.5 kg)  SpO2 97%  BMI 29.86 kg/m2 Estimated body mass index is 29.86 kg/(m^2) as calculated from the following:    Height as of this encounter: 6' 2\" (1.88 m).    Weight as of this encounter: 232 lb 9.6 oz (105.5 kg).  Medication Reconciliation: complete    Face to Face time 5 minutes  Gaby Hill CMA      "

## 2017-09-11 NOTE — PROGRESS NOTES
Kaleb Pennington is a 72 year old male who is presenting at the current time to discuss his diagnosi(es) of:       Pancreatic mass  Ascending aortic aneurysm (H)  Abdominal aortic aneurysm without rupture (H)  Tobacco abuse  Bruit of right carotid artery  Essential hypertension  IFG (impaired fasting glucose)  Hyperlipidemia LDL goal <100  Screening for prostate cancer     Review Of Systems  Skin: negative  Eyes: negative  Ears/Nose/Throat: negative  Respiratory: No shortness of breath, dyspnea on exertion, cough, or hemoptysis  Cardiovascular: negative  Gastrointestinal: negative  Genitourinary: negative  Musculoskeletal: negative  Neurologic: negative  Psychiatric: negative  Hematologic/Lymphatic/Immunologic: negative  Endocrine: negative      PAST MEDICAL HISTORY:                History reviewed. No pertinent past medical history.    PAST SURGICAL HISTORY:                  Past Surgical History:   Procedure Laterality Date     ORTHOPEDIC SURGERY         CURRENT MEDICATIONS:                  Current Outpatient Prescriptions   Medication Sig Dispense Refill     metoprolol (LOPRESSOR) 25 MG tablet Take 0.5 tablets (12.5 mg) by mouth 2 times daily 90 tablet 3     losartan (COZAAR) 100 MG tablet Take 1 tablet (100 mg) by mouth daily 90 tablet 3     atorvastatin (LIPITOR) 40 MG tablet Take 1 tablet (40 mg) by mouth daily 90 tablet 3     varenicline (CHANTIX) 1 MG tablet Take 1 tablet (1 mg) by mouth 2 times daily 180 tablet 0     FOLIC ACID PO Take 1 mg by mouth daily       Methotrexate Sodium (METHOTREXATE PO) Take 6 tablets by mouth once a week       predniSONE (DELTASONE) 10 MG tablet Take 1 tablet (10 mg) by mouth daily (Patient taking differently: Take 5 mg by mouth daily ) 150 tablet 0     varenicline (CHANTIX STARTING MONTH VIET) 0.5 MG X 11 & 1 MG X 42 tablet Take 0.5 mg tab daily for 3 days, then 0.5 mg tab twice daily for 4 days, then 1 mg twice daily. 53 tablet 0     [DISCONTINUED] metoprolol (LOPRESSOR) 25  MG tablet Take 0.5 tablets (12.5 mg) by mouth 2 times daily 90 tablet 3     [DISCONTINUED] atorvastatin (LIPITOR) 40 MG tablet Take 1 tablet (40 mg) by mouth daily 90 tablet 3     [DISCONTINUED] losartan (COZAAR) 100 MG tablet Take 1 tablet (100 mg) by mouth daily 90 tablet 3       ALLERGIES:                  Allergies   Allergen Reactions     Bee Venom Anaphylaxis       SOCIAL HISTORY:                  Social History     Social History     Marital status: Single     Spouse name: N/A     Number of children: N/A     Years of education: N/A     Occupational History     Not on file.     Social History Main Topics     Smoking status: Light Tobacco Smoker     Packs/day: 1.50     Years: 50.00     Types: Cigarettes     Smokeless tobacco: Never Used     Alcohol use No     Drug use: No     Sexual activity: Not Currently     Partners: Female     Other Topics Concern     Not on file     Social History Narrative       FAMILY HISTORY:                   Family History   Problem Relation Age of Onset     CANCER Mother      CANCER Maternal Grandmother      CANCER Sister      CANCER Daughter          Physical exam Reveals:    O/P: WNL  HEENT: WNL  NECK: No JVD, thyromegaly, or lymphadenopathy  HEART: RRR, no murmurs, gallops, or rubs  LUNGS: CTA bilaterally without rales, wheezes, or rhonchi  GI: NABS, nondistended, nontender, soft  EXT:without cyanosis, clubbing, or edema  NEURO: nonfocal  : no flank tenderness       A/P:    (Z12.5) Screening for prostate cancer  (primary encounter diagnosis)  Comment: he needs a PSA   Plan: Follow-Up with Vascular Medicine, Prostate spec        antigen screen           (K86.9) Pancreatic mass  Comment: he is advised to get this followed up upon with GI as I have previously instructed him to do. He is non compliant.  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 Repeat carotid duplex in year  Plan: Follow-Up with Vascular Medicine            (I10) Essential hypertension  Comment: bp controlled  Plan: metoprolol (LOPRESSOR) 25 MG tablet, losartan         (COZAAR) 100 MG tablet, Follow-Up with Vascular        Medicine, Basic metabolic panel            (R73.01) IFG (impaired fasting glucose)  Comment: at goal  Plan: Follow-Up with Vascular Medicine, Hepatic         panel, Lipid Profile, Hemoglobin A1c           (E78.5) Hyperlipidemia LDL goal <100  Comment: at goal  Plan: losartan (COZAAR) 100 MG tablet, atorvastatin         (LIPITOR) 40 MG tablet, Follow-Up with Vascular        Medicine, T3 Free, T4 free, TSH, Lipid Profile

## 2017-09-21 ENCOUNTER — TELEPHONE (OUTPATIENT)
Dept: OTHER | Facility: CLINIC | Age: 72
End: 2017-09-21

## 2017-09-21 DIAGNOSIS — Z79.52 LONG TERM CURRENT USE OF SYSTEMIC STEROIDS: Primary | ICD-10-CM

## 2017-09-21 NOTE — TELEPHONE ENCOUNTER
Patient would like to have a DEXA scan.  His mother has severe osteoporsis.  Please advise  Mica Keys RN, BSN

## 2017-10-11 ENCOUNTER — HOSPITAL ENCOUNTER (OUTPATIENT)
Dept: BONE DENSITY | Facility: CLINIC | Age: 72
Discharge: HOME | End: 2017-10-11
Attending: INTERNAL MEDICINE | Admitting: INTERNAL MEDICINE
Payer: MEDICARE

## 2017-10-11 DIAGNOSIS — Z79.52 LONG TERM CURRENT USE OF SYSTEMIC STEROIDS: ICD-10-CM

## 2017-10-11 PROCEDURE — 77080 DXA BONE DENSITY AXIAL: CPT

## 2017-10-12 ENCOUNTER — OFFICE VISIT (OUTPATIENT)
Dept: OTHER | Facility: CLINIC | Age: 72
End: 2017-10-12
Attending: INTERNAL MEDICINE
Payer: COMMERCIAL

## 2017-10-12 VITALS
RESPIRATION RATE: 12 BRPM | SYSTOLIC BLOOD PRESSURE: 128 MMHG | BODY MASS INDEX: 28.62 KG/M2 | DIASTOLIC BLOOD PRESSURE: 66 MMHG | HEART RATE: 64 BPM | HEIGHT: 74 IN | WEIGHT: 223 LBS | OXYGEN SATURATION: 98 %

## 2017-10-12 DIAGNOSIS — M85.80 OSTEOPENIA, UNSPECIFIED LOCATION: Primary | ICD-10-CM

## 2017-10-12 DIAGNOSIS — Z72.0 TOBACCO ABUSE: ICD-10-CM

## 2017-10-12 PROCEDURE — 99406 BEHAV CHNG SMOKING 3-10 MIN: CPT | Mod: ZP | Performed by: INTERNAL MEDICINE

## 2017-10-12 PROCEDURE — 99214 OFFICE O/P EST MOD 30 MIN: CPT | Mod: ZP | Performed by: INTERNAL MEDICINE

## 2017-10-12 PROCEDURE — 99211 OFF/OP EST MAY X REQ PHY/QHP: CPT

## 2017-10-12 RX ORDER — NICOTINE 21 MG/24HR
1 PATCH, TRANSDERMAL 24 HOURS TRANSDERMAL EVERY 24 HOURS
Qty: 30 PATCH | Refills: 0 | Status: SHIPPED | OUTPATIENT
Start: 2017-10-12 | End: 2019-04-19

## 2017-10-12 RX ORDER — ALENDRONATE SODIUM 35 MG/1
35 TABLET ORAL
Qty: 13 TABLET | Refills: 3 | Status: SHIPPED | OUTPATIENT
Start: 2017-10-12 | End: 2018-09-22

## 2017-10-12 NOTE — NURSING NOTE
"Chief Complaint   Patient presents with     RECHECK     f/u Dexa scan       Initial /66 (BP Location: Right arm, Patient Position: Chair, Cuff Size: Adult Large)  Pulse 64  Ht 6' 2\" (1.88 m)  Wt 223 lb (101.2 kg)  SpO2 98%  BMI 28.63 kg/m2 Estimated body mass index is 28.63 kg/(m^2) as calculated from the following:    Height as of this encounter: 6' 2\" (1.88 m).    Weight as of this encounter: 223 lb (101.2 kg).  Medication Reconciliation: complete    Face to Face time 5 minutes  Gaby Hill CMA      "

## 2017-10-12 NOTE — PROGRESS NOTES
Kaleb Pennington is a 72 year old male who is presenting at the current time to discuss his diagnosi(es) of :       Osteopenia, unspecified location  Tobacco abuse .      Review Of Systems  Skin: negative  Eyes: negative  Ears/Nose/Throat: negative  Respiratory: No shortness of breath, dyspnea on exertion, cough, or hemoptysis  Cardiovascular: negative  Gastrointestinal: negative  Genitourinary: negative  Musculoskeletal: back pain  Neurologic: negative  Psychiatric: negative  Hematologic/Lymphatic/Immunologic: negative  Endocrine: negative     PAST MEDICAL HISTORY:                History reviewed. No pertinent past medical history.    PAST SURGICAL HISTORY:                  Past Surgical History:   Procedure Laterality Date     ORTHOPEDIC SURGERY         CURRENT MEDICATIONS:                  Current Outpatient Prescriptions   Medication Sig Dispense Refill     metoprolol (LOPRESSOR) 25 MG tablet Take 0.5 tablets (12.5 mg) by mouth 2 times daily 90 tablet 3     losartan (COZAAR) 100 MG tablet Take 1 tablet (100 mg) by mouth daily 90 tablet 3     atorvastatin (LIPITOR) 40 MG tablet Take 1 tablet (40 mg) by mouth daily 90 tablet 3     varenicline (CHANTIX) 1 MG tablet Take 1 tablet (1 mg) by mouth 2 times daily 180 tablet 0     FOLIC ACID PO Take 1 mg by mouth daily       Methotrexate Sodium (METHOTREXATE PO) Take 6 tablets by mouth once a week       predniSONE (DELTASONE) 10 MG tablet Take 1 tablet (10 mg) by mouth daily (Patient taking differently: Take 5 mg by mouth daily ) 150 tablet 0     varenicline (CHANTIX STARTING MONTH VIET) 0.5 MG X 11 & 1 MG X 42 tablet Take 0.5 mg tab daily for 3 days, then 0.5 mg tab twice daily for 4 days, then 1 mg twice daily. 53 tablet 0       ALLERGIES:                  Allergies   Allergen Reactions     Bee Venom Anaphylaxis       SOCIAL HISTORY:                  Social History     Social History     Marital status: Single     Spouse name: N/A     Number of children: N/A     Years  of education: N/A     Occupational History     Not on file.     Social History Main Topics     Smoking status: Light Tobacco Smoker     Packs/day: 1.50     Years: 50.00     Types: Cigarettes     Smokeless tobacco: Never Used     Alcohol use No     Drug use: No     Sexual activity: Not Currently     Partners: Female     Other Topics Concern     Not on file     Social History Narrative       FAMILY HISTORY:                   Family History   Problem Relation Age of Onset     CANCER Mother      CANCER Maternal Grandmother      CANCER Sister      CANCER Daughter          Physical exam Reveals:    O/P: WNL  HEENT: WNL  NECK: No JVD, thyromegaly, or lymphadenopathy  HEART: RRR, no murmurs, gallops, or rubs  LUNGS: CTA bilaterally without rales, wheezes, or rhonchi  GI: NABS, nondistended, nontender, soft  EXT:without cyanosis, clubbing, or edema  NEURO: nonfocal  : no flank tenderness    A/P:    (M85.80) Osteopenia, unspecified location, steroid and tobacco induced  (primary encounter diagnosis)  Comment: he needs t start Fosamax, and insure calcium and vitamin D intake  Plan: see rxs    (Z72.0) Tobacco abuse  Comment: needs to stop smoking, accepts pharmacologic assistance with nicotine patches to be added to CHantix, he knows not to smoke on the patches  Plan: HC SMOKING CESSATION COUNSELING, ASYMPTOMATIC,         3-10 MIN

## 2017-10-12 NOTE — MR AVS SNAPSHOT
After Visit Summary   10/12/2017    Kaleb Pennington    MRN: [id number]           Patient Information     Date Of Birth          1945        Visit Information        Provider Department      10/12/2017 11:00 AM Christopher Henry MD [facility] Center      Today's Diagnoses     Osteopenia, unspecified location, steroid and tobacco induced    -  1    Tobacco abuse           Follow-ups after your visit        Your next 10 appointments already scheduled     Dec 01, 2017 10:00 AM CST   LAB with  LAB ONLY   [facility] ([facility])    [address] [place] [address]   [phone]           Patient must bring picture ID. Patient should be prepared to give a urine specimen  Please do not eat 10-12 hours before your appointment if you are coming in fasting for labs on lipids, cholesterol, or glucose (sugar). Pregnant women should follow their Care Team instructions. Water with medications is okay. Do not drink coffee or other fluids. If you have concerns about taking  your medications, please ask at office or if scheduling via [employer], send a message by clicking on Secure Messaging, Message Your Care Team.            Dec 27, 2017 10:00 AM CST   Return Visit with Christopher Henry MD   [facility] ([facility])    [address]  [place] [address]   [phone]            Mar 01, 2018 10:00 AM CST   LAB with  LAB ONLY   [facility] ([facility])    [address] [place] [address]   [phone]           Patient must bring picture ID. Patient should be prepared to give a urine specimen  Please do not eat 10-12 hours before your appointment if you are coming in fasting for labs on lipids, cholesterol, or glucose (sugar). Pregnant women should follow their Care Team  "instructions. Water with medications is okay. Do not drink coffee or other fluids. If you have concerns about taking  your medications, please ask at office or if scheduling via [employer], send a message by clicking on Secure Messaging, Message Your Care Team.              Who to contact     If you have questions or need follow up information about today's clinic visit or your schedule please contact [facility] directly at [phone].  Normal or non-critical lab and imaging results will be communicated to you by [employer], letter or phone within 4 business days after the clinic has received the results. If you do not hear from us within 7 days, please contact the clinic through [employer] or phone. If you have a critical or abnormal lab result, we will notify you by phone as soon as possible.  Submit refill requests through [employer] or call your pharmacy and they will forward the refill request to us. Please allow 3 business days for your refill to be completed.          Additional Information About Your Visit        MobPanel Information     [employer] lets you send messages to your doctor, view your test results, renew your prescriptions, schedule appointments and more. To sign up, go to [email or web address] . Click on \"Log in\" on the left side of the screen, which will take you to the Welcome page. Then click on \"Sign up Now\" on the right side of the page.     You will be asked to enter the access code listed below, as well as some personal information. Please follow the directions to create your username and password.     Your access code is: [email or web address]  Expires: 1/10/2018 11:46 AM     Your access code will  in 90 days. If you need help or a new code, please call your [facility] clinic or [phone].        Care EveryWhere ID     This is your Care EveryWhere ID. This could be used by other organizations to access your [facility] medical records  [id number]        Your Vitals Were     " "Pulse Respirations Height Pulse Oximetry BMI (Body Mass Index)       64 12 6' 2\" (1.88 m) 98% 28.63 kg/m2        Blood Pressure from Last 3 Encounters:   10/12/17 128/66   09/11/17 128/62   07/05/17 128/68    Weight from Last 3 Encounters:   10/12/17 223 lb (101.2 kg)   09/11/17 232 lb 9.6 oz (105.5 kg)   07/05/17 234 lb (106.1 kg)              We Performed the Following     HC SMOKING CESSATION COUNSELING, ASYMPTOMATIC, 3-10 MIN          Today's Medication Changes          These changes are accurate as of: 10/12/17 11:59 PM.  If you have any questions, ask your nurse or doctor.               Start taking these medicines.        Dose/Directions    alendronate 35 MG tablet   Commonly known as:  FOSAMAX   Used for:  Osteopenia, unspecified location   Started by:  Christopher Henry MD        Dose:  35 mg   Take 1 tablet (35 mg) by mouth every 7 days   Quantity:  13 tablet   Refills:  3       calcium carbonate-vitamin D 600-400 MG-UNIT Chew   Used for:  Osteopenia, unspecified location   Started by:  Christopher Henry MD        Dose:  1 chew tab   Take 1 chew tab by mouth 2 times daily   Quantity:  180 tablet   Refills:  3       * nicotine 14 MG/24HR 24 hr patch   Commonly known as:  NICODERM CQ   Used for:  Tobacco abuse   Started by:  Christopher Henry MD        Dose:  1 patch   Place 1 patch onto the skin every 24 hours   Quantity:  30 patch   Refills:  0       * nicotine 7 MG/24HR 24 hr patch   Commonly known as:  NICODERM CQ   Used for:  Tobacco abuse   Started by:  Christopher Henry MD        Dose:  1 patch   Place 1 patch onto the skin every 24 hours   Quantity:  30 patch   Refills:  0       * Notice:  This list has 2 medication(s) that are the same as other medications prescribed for you. Read the directions carefully, and ask your doctor or other care provider to review them with you.      These medicines have changed or have updated prescriptions.        Dose/Directions    " predniSONE 10 MG tablet   Commonly known as:  DELTASONE   This may have changed:  how much to take   Used for:  Aortitis (H)        Dose:  10 mg   Take 1 tablet (10 mg) by mouth daily   Quantity:  150 tablet   Refills:  0            Where to get your medicines      These medications were sent to [id number] - [place] - [address]  [address], [place] [address]     Phone:  [phone]     alendronate 35 MG tablet    calcium carbonate-vitamin D 600-400 MG-UNIT Chew    nicotine 14 MG/24HR 24 hr patch    nicotine 7 MG/24HR 24 hr patch                Primary Care Provider Office Phone # Fax #    Christopher Henry [phone]       [facility] [address]  [place] [address]        Equal Access to Services     SHUKRI BENITEZ AH: Hadii marlene sanders hadasho Soashley, waaxda luqadaha, qaybta kaalmada adeegyada, mayela sparks . So [facility] [phone].    ATENCIÓN: Si habla español, tiene a ramirez disposición servicios gratuitos de asistencia lingüística. [facility] [phone].    We comply with applicable federal civil rights laws and [place] laws. We do not discriminate on the basis of race, color, national origin, age, disability, sex, sexual orientation, or gender identity.            Thank you!     Thank you for choosing [facility] [place]  for your care. Our goal is always to provide you with excellent care. Hearing back from our patients is one way we can continue to improve our services. Please take a few minutes to complete the written survey that you may receive in the mail after your visit with us. Thank you!             Your Updated Medication List - Protect others around you: Learn how to safely use, store and throw away your medicines at www.disposemymeds.org.          This list is accurate as of: 10/12/17 11:59 PM.  Always use your most recent med list.                   Brand Name Dispense Instructions for use Diagnosis     alendronate 35 MG tablet    FOSAMAX    13 tablet    Take 1 tablet (35 mg) by mouth every 7 days    Osteopenia, unspecified location       atorvastatin 40 MG tablet    LIPITOR    90 tablet    Take 1 tablet (40 mg) by mouth daily    Hyperlipidemia LDL goal <100       calcium carbonate-vitamin D 600-400 MG-UNIT Chew     180 tablet    Take 1 chew tab by mouth 2 times daily    Osteopenia, unspecified location       FOLIC ACID PO      Take 1 mg by mouth daily        losartan 100 MG tablet    COZAAR    90 tablet    Take 1 tablet (100 mg) by mouth daily    Essential hypertension, Hyperlipidemia LDL goal <100       METHOTREXATE PO      Take 6 tablets by mouth once a week        metoprolol 25 MG tablet    LOPRESSOR    90 tablet    Take 0.5 tablets (12.5 mg) by mouth 2 times daily    Essential hypertension       * nicotine 14 MG/24HR 24 hr patch    NICODERM CQ    30 patch    Place 1 patch onto the skin every 24 hours    Tobacco abuse       * nicotine 7 MG/24HR 24 hr patch    NICODERM CQ    30 patch    Place 1 patch onto the skin every 24 hours    Tobacco abuse       predniSONE 10 MG tablet    DELTASONE    150 tablet    Take 1 tablet (10 mg) by mouth daily    Aortitis (H)       * varenicline 0.5 MG X 11 & 1 MG X 42 tablet    CHANTIX STARTING MONTH VIET    53 tablet    Take 0.5 mg tab daily for 3 days, then 0.5 mg tab twice daily for 4 days, then 1 mg twice daily.    Tobacco abuse       * varenicline 1 MG tablet    CHANTIX    180 tablet    Take 1 tablet (1 mg) by mouth 2 times daily    Tobacco abuse       * Notice:  This list has 4 medication(s) that are the same as other medications prescribed for you. Read the directions carefully, and ask your doctor or other care provider to review them with you.

## 2017-10-18 ENCOUNTER — TELEPHONE (OUTPATIENT)
Dept: OTHER | Facility: CLINIC | Age: 72
End: 2017-10-18

## 2017-10-18 NOTE — TELEPHONE ENCOUNTER
Even if testoserone is low, I would not recommend supplementation due to increased risk of stroke, heart attack, and prostate cancer with its supplementation. He is missing the forest for the trees herein. He has osteopenia due to smoking and prolonged steroid use. As such I would not recommend testing as even if low, for above reasons, I would not recommend supplementation. If he has further questions, have him see me in an open spot in the next day or so.

## 2017-10-18 NOTE — TELEPHONE ENCOUNTER
Patient would like to test testosterone levels as he is concerned that it is related to his osteopenia.  Please advise  Mica Keys, RN, BSN

## 2017-10-31 ENCOUNTER — TELEPHONE (OUTPATIENT)
Dept: OTHER | Facility: CLINIC | Age: 72
End: 2017-10-31

## 2017-10-31 NOTE — TELEPHONE ENCOUNTER
Patient is wondering if he's supposed to taper off of prednisone or refill this RX?  He is worried about its correlation of med with bone loss.  Please advise  Mica Keys, RN, BSN

## 2017-11-22 ENCOUNTER — TELEPHONE (OUTPATIENT)
Dept: OTHER | Facility: CLINIC | Age: 72
End: 2017-11-22

## 2017-11-22 DIAGNOSIS — G62.9 NEUROPATHY: Primary | ICD-10-CM

## 2017-11-22 NOTE — TELEPHONE ENCOUNTER
Patient called and asked if he has been tested for syphilis.  He was speaking with the neurologist at [facility] and he mentioned that he should be tested.  He is wondering what the process is.  Please advise  Mica Keys RN, BSN

## 2017-11-30 NOTE — TELEPHONE ENCOUNTER
Chantix  Last Written Prescription Date: 7/14/17  Last Fill Quantity: 2 boxes, # refills: 0  Last Office Visit with [facility], [facility] or [facility] prescribing provider: 10/12/17   Next 5 appointments (look out 90 days)     Dec 27, 2017 10:00 AM CST   Return Visit with Christopher Henry MD   [facility] ([facility])    [address]  [facility] [address]   [phone]                   BP Readings from Last 3 Encounters:   10/12/17 128/66   09/11/17 128/62   07/05/17 128/68     Order placed through [employer] assistance program   Patient # [id number]  Order placed for 2 boxes of 1.0mg tablets  Will be sent to Office  Order will be shipped out in seven to 10 days  Mica Keys RN, BSN

## 2017-12-01 ENCOUNTER — HOSPITAL ENCOUNTER (OUTPATIENT)
Dept: LAB | Facility: CLINIC | Age: 72
Discharge: HOME | End: 2017-12-01
Attending: INTERNAL MEDICINE | Admitting: INTERNAL MEDICINE
Payer: MEDICARE

## 2017-12-01 DIAGNOSIS — I77.6 VASCULITIS (H): ICD-10-CM

## 2017-12-01 DIAGNOSIS — G62.9 NEUROPATHY: ICD-10-CM

## 2017-12-01 LAB
AST SERPL W P-5'-P-CCNC: 23 U/L (ref 0–45)
BASOPHILS # BLD AUTO: 0 10E9/L (ref 0–0.2)
BASOPHILS NFR BLD AUTO: 0.5 %
CREAT SERPL-MCNC: 1 MG/DL (ref 0.66–1.25)
DIFFERENTIAL METHOD BLD: ABNORMAL
EOSINOPHIL # BLD AUTO: 0.4 10E9/L (ref 0–0.7)
EOSINOPHIL NFR BLD AUTO: 5.8 %
ERYTHROCYTE [DISTWIDTH] IN BLOOD BY AUTOMATED COUNT: 13.9 % (ref 10–15)
GFR SERPL CREATININE-BSD FRML MDRD: 73 ML/MIN/1.7M2
HCT VFR BLD AUTO: 42 % (ref 40–53)
HGB BLD-MCNC: 14.2 G/DL (ref 13.3–17.7)
IMM GRANULOCYTES # BLD: 0 10E9/L (ref 0–0.4)
IMM GRANULOCYTES NFR BLD: 0.3 %
LYMPHOCYTES # BLD AUTO: 1.4 10E9/L (ref 0.8–5.3)
LYMPHOCYTES NFR BLD AUTO: 18.8 %
MCH RBC QN AUTO: 33.8 PG (ref 26.5–33)
MCHC RBC AUTO-ENTMCNC: 33.8 G/DL (ref 31.5–36.5)
MCV RBC AUTO: 100 FL (ref 78–100)
MONOCYTES # BLD AUTO: 0.5 10E9/L (ref 0–1.3)
MONOCYTES NFR BLD AUTO: 7 %
NEUTROPHILS # BLD AUTO: 5.1 10E9/L (ref 1.6–8.3)
NEUTROPHILS NFR BLD AUTO: 67.6 %
NRBC # BLD AUTO: 0 10*3/UL
NRBC BLD AUTO-RTO: 0 /100
PLATELET # BLD AUTO: 277 10E9/L (ref 150–450)
RBC # BLD AUTO: 4.2 10E12/L (ref 4.4–5.9)
WBC # BLD AUTO: 7.6 10E9/L (ref 4–11)

## 2017-12-01 PROCEDURE — 82565 ASSAY OF CREATININE: CPT | Performed by: INTERNAL MEDICINE

## 2017-12-01 PROCEDURE — 84450 TRANSFERASE (AST) (SGOT): CPT | Performed by: INTERNAL MEDICINE

## 2017-12-01 PROCEDURE — 36415 COLL VENOUS BLD VENIPUNCTURE: CPT | Performed by: INTERNAL MEDICINE

## 2017-12-01 PROCEDURE — 85025 COMPLETE CBC W/AUTO DIFF WBC: CPT | Performed by: INTERNAL MEDICINE

## 2017-12-01 PROCEDURE — 86780 TREPONEMA PALLIDUM: CPT | Performed by: INTERNAL MEDICINE

## 2017-12-02 LAB — T PALLIDUM IGG+IGM SER QL: NEGATIVE

## 2017-12-06 NOTE — TELEPHONE ENCOUNTER
Patient called and informed that medication arrived  Med logged into book  Awaiting return call  Mica Keys RN, BSN

## 2017-12-07 ENCOUNTER — TELEPHONE (OUTPATIENT)
Dept: OTHER | Facility: CLINIC | Age: 72
End: 2017-12-07

## 2017-12-07 DIAGNOSIS — M54.16 LUMBAR RADICULOPATHY: Primary | ICD-10-CM

## 2017-12-07 DIAGNOSIS — G56.00 CTS (CARPAL TUNNEL SYNDROME): ICD-10-CM

## 2017-12-07 NOTE — TELEPHONE ENCOUNTER
Patient would like to have EMG done in [facility] instead of [facility],   Order entered as recommemded with dx as written from fax from [facility] from Dr Smith  RX to Fax results to   Dr Smith  Department of Neurology  [facility]  [phone]  Mica Keys, RN, BSN

## 2017-12-12 NOTE — TELEPHONE ENCOUNTER
Referral was sent to [facility] office to coordinate appointment  Mica Keys, RN, BSN

## 2017-12-13 ENCOUNTER — TRANSFERRED RECORDS (OUTPATIENT)
Dept: HEALTH INFORMATION MANAGEMENT | Facility: CLINIC | Age: 72
End: 2017-12-13

## 2017-12-19 ENCOUNTER — TELEPHONE (OUTPATIENT)
Dept: OTHER | Facility: CLINIC | Age: 72
End: 2017-12-19

## 2017-12-19 NOTE — TELEPHONE ENCOUNTER
He should come in for an appointment if he has a lot of questions. Please send him info on emphysema, and if that is not enough for him, schedule him to see me.

## 2017-12-19 NOTE — TELEPHONE ENCOUNTER
Pt reports that on an AVS that he has from 5/31/16 office visit with  there is an associated diagnosis of panlobular emphysema.  Pt would like information on panlobular emphysema, if he has it.  Unable to find diagnosis in pt's chart at all.  Will route to PCP.  Would you like me to send the pt information on emphysema?    Chika Bailey, JAIRN, RN

## 2017-12-22 ENCOUNTER — TRANSFERRED RECORDS (OUTPATIENT)
Dept: HEALTH INFORMATION MANAGEMENT | Facility: CLINIC | Age: 72
End: 2017-12-22

## 2018-01-08 ENCOUNTER — TELEPHONE (OUTPATIENT)
Dept: OTHER | Facility: CLINIC | Age: 73
End: 2018-01-08

## 2018-01-08 NOTE — TELEPHONE ENCOUNTER
Pt was scheduled for follow up with  on 1/12/18.  PT LM stating that he needs to cancel the appointment because his daughter has a bad infection in her hip that was replaced and she is having surgery on Friday.  Pt will callback to reschedule.    Chika Bailey, JAIRN, RN

## 2018-02-02 ENCOUNTER — TELEPHONE (OUTPATIENT)
Dept: OTHER | Facility: CLINIC | Age: 73
End: 2018-02-02

## 2018-02-02 DIAGNOSIS — Z72.0 TOBACCO ABUSE: ICD-10-CM

## 2018-02-02 RX ORDER — VARENICLINE TARTRATE 1 MG/1
1 TABLET, FILM COATED ORAL 2 TIMES DAILY
Qty: 180 TABLET | Refills: 0 | Status: SHIPPED | OUTPATIENT
Start: 2018-02-02 | End: 2018-05-07

## 2018-03-01 ENCOUNTER — HOSPITAL ENCOUNTER (OUTPATIENT)
Dept: LAB | Facility: CLINIC | Age: 73
Discharge: HOME | End: 2018-03-01
Attending: INTERNAL MEDICINE | Admitting: INTERNAL MEDICINE
Payer: MEDICARE

## 2018-03-01 DIAGNOSIS — I77.6 VASCULITIS (H): ICD-10-CM

## 2018-03-01 LAB
AST SERPL W P-5'-P-CCNC: 29 U/L (ref 0–45)
BASOPHILS # BLD AUTO: 0 10E9/L (ref 0–0.2)
BASOPHILS NFR BLD AUTO: 0.3 %
CREAT SERPL-MCNC: 0.98 MG/DL (ref 0.66–1.25)
DIFFERENTIAL METHOD BLD: ABNORMAL
EOSINOPHIL # BLD AUTO: 0.2 10E9/L (ref 0–0.7)
EOSINOPHIL NFR BLD AUTO: 2.9 %
ERYTHROCYTE [DISTWIDTH] IN BLOOD BY AUTOMATED COUNT: 13.5 % (ref 10–15)
GFR SERPL CREATININE-BSD FRML MDRD: 75 ML/MIN/1.7M2
HCT VFR BLD AUTO: 43.2 % (ref 40–53)
HGB BLD-MCNC: 14.8 G/DL (ref 13.3–17.7)
IMM GRANULOCYTES # BLD: 0 10E9/L (ref 0–0.4)
IMM GRANULOCYTES NFR BLD: 0.3 %
LYMPHOCYTES # BLD AUTO: 1.4 10E9/L (ref 0.8–5.3)
LYMPHOCYTES NFR BLD AUTO: 20.8 %
MCH RBC QN AUTO: 33.8 PG (ref 26.5–33)
MCHC RBC AUTO-ENTMCNC: 34.3 G/DL (ref 31.5–36.5)
MCV RBC AUTO: 99 FL (ref 78–100)
MONOCYTES # BLD AUTO: 0.5 10E9/L (ref 0–1.3)
MONOCYTES NFR BLD AUTO: 7.3 %
NEUTROPHILS # BLD AUTO: 4.7 10E9/L (ref 1.6–8.3)
NEUTROPHILS NFR BLD AUTO: 68.4 %
NRBC # BLD AUTO: 0 10*3/UL
NRBC BLD AUTO-RTO: 0 /100
PLATELET # BLD AUTO: 243 10E9/L (ref 150–450)
RBC # BLD AUTO: 4.38 10E12/L (ref 4.4–5.9)
WBC # BLD AUTO: 6.8 10E9/L (ref 4–11)

## 2018-03-01 PROCEDURE — 85025 COMPLETE CBC W/AUTO DIFF WBC: CPT | Performed by: INTERNAL MEDICINE

## 2018-03-01 PROCEDURE — 36415 COLL VENOUS BLD VENIPUNCTURE: CPT | Performed by: INTERNAL MEDICINE

## 2018-03-01 PROCEDURE — 82565 ASSAY OF CREATININE: CPT | Performed by: INTERNAL MEDICINE

## 2018-03-01 PROCEDURE — 84450 TRANSFERASE (AST) (SGOT): CPT | Performed by: INTERNAL MEDICINE

## 2018-03-21 ENCOUNTER — TRANSFERRED RECORDS (OUTPATIENT)
Dept: HEALTH INFORMATION MANAGEMENT | Facility: CLINIC | Age: 73
End: 2018-03-21

## 2018-05-07 DIAGNOSIS — Z72.0 TOBACCO ABUSE: ICD-10-CM

## 2018-05-08 RX ORDER — VARENICLINE TARTRATE 1 MG/1
TABLET, FILM COATED ORAL
Qty: 180 TABLET | Refills: 0 | Status: SHIPPED | OUTPATIENT
Start: 2018-05-08 | End: 2018-08-05

## 2018-05-23 ENCOUNTER — TELEPHONE (OUTPATIENT)
Dept: OTHER | Facility: CLINIC | Age: 73
End: 2018-05-23

## 2018-05-23 DIAGNOSIS — I77.6 AORTITIS (H): Primary | ICD-10-CM

## 2018-05-23 DIAGNOSIS — G62.9 PERIPHERAL NEUROPATHY: Primary | ICD-10-CM

## 2018-05-23 NOTE — TELEPHONE ENCOUNTER
Patient needs labs drawn as recommended by neurology as patient wanted PCP to F/u with them.  Please see last Neurology note  Labs ordered as recommended.  Mica Keys RN, BSN

## 2018-06-29 ENCOUNTER — TRANSFERRED RECORDS (OUTPATIENT)
Dept: HEALTH INFORMATION MANAGEMENT | Facility: CLINIC | Age: 73
End: 2018-06-29

## 2018-07-03 ENCOUNTER — MEDICAL CORRESPONDENCE (OUTPATIENT)
Dept: HEALTH INFORMATION MANAGEMENT | Facility: CLINIC | Age: 73
End: 2018-07-03

## 2018-07-03 DIAGNOSIS — M54.50 BILATERAL LOW BACK PAIN WITHOUT SCIATICA: ICD-10-CM

## 2018-07-03 DIAGNOSIS — S54.02XS: Primary | ICD-10-CM

## 2018-07-06 ENCOUNTER — HOSPITAL ENCOUNTER (OUTPATIENT)
Dept: LAB | Facility: CLINIC | Age: 73
Discharge: HOME | End: 2018-07-06
Attending: INTERNAL MEDICINE | Admitting: INTERNAL MEDICINE
Payer: MEDICARE

## 2018-07-06 DIAGNOSIS — R73.01 IFG (IMPAIRED FASTING GLUCOSE): ICD-10-CM

## 2018-07-06 DIAGNOSIS — I77.6 AORTITIS (H): ICD-10-CM

## 2018-07-06 DIAGNOSIS — E78.5 HYPERLIPIDEMIA LDL GOAL <100: ICD-10-CM

## 2018-07-06 DIAGNOSIS — R73.01 IFG (IMPAIRED FASTING GLUCOSE): Primary | ICD-10-CM

## 2018-07-06 DIAGNOSIS — G62.9 PERIPHERAL NEUROPATHY: ICD-10-CM

## 2018-07-06 DIAGNOSIS — M54.50 BILATERAL LOW BACK PAIN WITHOUT SCIATICA: ICD-10-CM

## 2018-07-06 DIAGNOSIS — Z12.5 SCREENING FOR PROSTATE CANCER: ICD-10-CM

## 2018-07-06 DIAGNOSIS — I71.40 ABDOMINAL AORTIC ANEURYSM WITHOUT RUPTURE (H): ICD-10-CM

## 2018-07-06 DIAGNOSIS — S54.02XS: ICD-10-CM

## 2018-07-06 DIAGNOSIS — I10 ESSENTIAL HYPERTENSION: ICD-10-CM

## 2018-07-06 LAB
ALBUMIN SERPL-MCNC: 3.9 G/DL (ref 3.4–5)
ALP SERPL-CCNC: 109 U/L (ref 40–150)
ALT SERPL W P-5'-P-CCNC: 26 U/L (ref 0–70)
ANION GAP SERPL CALCULATED.3IONS-SCNC: 7 MMOL/L (ref 3–14)
AST SERPL W P-5'-P-CCNC: 22 U/L (ref 0–45)
B BURGDOR IGG+IGM SER QL: 0.04 (ref 0–0.89)
BASOPHILS # BLD AUTO: 0 10E9/L (ref 0–0.2)
BASOPHILS NFR BLD AUTO: 0.3 %
BILIRUB DIRECT SERPL-MCNC: 0.2 MG/DL (ref 0–0.2)
BILIRUB SERPL-MCNC: 0.6 MG/DL (ref 0.2–1.3)
BUN SERPL-MCNC: 15 MG/DL (ref 7–30)
CALCIUM SERPL-MCNC: 9 MG/DL (ref 8.5–10.1)
CHLORIDE SERPL-SCNC: 102 MMOL/L (ref 94–109)
CHOLEST SERPL-MCNC: 112 MG/DL
CO2 SERPL-SCNC: 28 MMOL/L (ref 20–32)
CREAT SERPL-MCNC: 0.94 MG/DL (ref 0.66–1.25)
CREAT UR-MCNC: 244 MG/DL
CRP SERPL-MCNC: <2.9 MG/L (ref 0–8)
DIFFERENTIAL METHOD BLD: NORMAL
EOSINOPHIL # BLD AUTO: 0.2 10E9/L (ref 0–0.7)
EOSINOPHIL NFR BLD AUTO: 1.8 %
ERYTHROCYTE [DISTWIDTH] IN BLOOD BY AUTOMATED COUNT: 14 % (ref 10–15)
ERYTHROCYTE [SEDIMENTATION RATE] IN BLOOD BY WESTERGREN METHOD: 7 MM/H (ref 0–20)
GFR SERPL CREATININE-BSD FRML MDRD: 79 ML/MIN/1.7M2
GLUCOSE SERPL-MCNC: 91 MG/DL (ref 70–99)
HBA1C MFR BLD: 5.9 % (ref 0–5.6)
HCT VFR BLD AUTO: 44.5 % (ref 40–53)
HDLC SERPL-MCNC: 48 MG/DL
HGB BLD-MCNC: 14.8 G/DL (ref 13.3–17.7)
IMM GRANULOCYTES # BLD: 0.1 10E9/L (ref 0–0.4)
IMM GRANULOCYTES NFR BLD: 0.6 %
LDLC SERPL CALC-MCNC: 53 MG/DL
LYMPHOCYTES # BLD AUTO: 1.3 10E9/L (ref 0.8–5.3)
LYMPHOCYTES NFR BLD AUTO: 14.6 %
MCH RBC QN AUTO: 32.6 PG (ref 26.5–33)
MCHC RBC AUTO-ENTMCNC: 33.3 G/DL (ref 31.5–36.5)
MCV RBC AUTO: 98 FL (ref 78–100)
MICROALBUMIN UR-MCNC: 15 MG/L
MICROALBUMIN/CREAT UR: 6.23 MG/G CR (ref 0–17)
MONOCYTES # BLD AUTO: 0.8 10E9/L (ref 0–1.3)
MONOCYTES NFR BLD AUTO: 9.1 %
NEUTROPHILS # BLD AUTO: 6.7 10E9/L (ref 1.6–8.3)
NEUTROPHILS NFR BLD AUTO: 73.6 %
NONHDLC SERPL-MCNC: 64 MG/DL
PLATELET # BLD AUTO: 275 10E9/L (ref 150–450)
POTASSIUM SERPL-SCNC: 4.1 MMOL/L (ref 3.4–5.3)
PROT SERPL-MCNC: 7 G/DL (ref 6.8–8.8)
PSA SERPL-ACNC: 0.52 UG/L (ref 0–4)
RBC # BLD AUTO: 4.54 10E12/L (ref 4.4–5.9)
SODIUM SERPL-SCNC: 137 MMOL/L (ref 133–144)
T3FREE SERPL-MCNC: 2.3 PG/ML (ref 2.3–4.2)
T4 FREE SERPL-MCNC: 0.84 NG/DL (ref 0.76–1.46)
TRIGL SERPL-MCNC: 54 MG/DL
TSH SERPL DL<=0.005 MIU/L-ACNC: 2.18 MU/L (ref 0.4–4)
VIT B12 SERPL-MCNC: 393 PG/ML (ref 193–986)
WBC # BLD AUTO: 9.1 10E9/L (ref 4–11)

## 2018-07-06 PROCEDURE — 86140 C-REACTIVE PROTEIN: CPT | Performed by: INTERNAL MEDICINE

## 2018-07-06 PROCEDURE — 82043 UR ALBUMIN QUANTITATIVE: CPT | Performed by: INTERNAL MEDICINE

## 2018-07-06 PROCEDURE — 85652 RBC SED RATE AUTOMATED: CPT | Performed by: INTERNAL MEDICINE

## 2018-07-06 PROCEDURE — 82607 VITAMIN B-12: CPT | Performed by: INTERNAL MEDICINE

## 2018-07-06 PROCEDURE — 83036 HEMOGLOBIN GLYCOSYLATED A1C: CPT | Performed by: INTERNAL MEDICINE

## 2018-07-06 PROCEDURE — 84481 FREE ASSAY (FT-3): CPT | Performed by: INTERNAL MEDICINE

## 2018-07-06 PROCEDURE — 82784 ASSAY IGA/IGD/IGG/IGM EACH: CPT | Performed by: INTERNAL MEDICINE

## 2018-07-06 PROCEDURE — 87476 LYME DIS DNA AMP PROBE: CPT | Performed by: INTERNAL MEDICINE

## 2018-07-06 PROCEDURE — 84443 ASSAY THYROID STIM HORMONE: CPT | Performed by: INTERNAL MEDICINE

## 2018-07-06 PROCEDURE — 84439 ASSAY OF FREE THYROXINE: CPT | Performed by: INTERNAL MEDICINE

## 2018-07-06 PROCEDURE — 80048 BASIC METABOLIC PNL TOTAL CA: CPT | Performed by: INTERNAL MEDICINE

## 2018-07-06 PROCEDURE — 85025 COMPLETE CBC W/AUTO DIFF WBC: CPT | Performed by: INTERNAL MEDICINE

## 2018-07-06 PROCEDURE — 86334 IMMUNOFIX E-PHORESIS SERUM: CPT | Performed by: INTERNAL MEDICINE

## 2018-07-06 PROCEDURE — 80076 HEPATIC FUNCTION PANEL: CPT | Performed by: INTERNAL MEDICINE

## 2018-07-06 PROCEDURE — 00000402 ZZHCL STATISTIC TOTAL PROTEIN: Performed by: INTERNAL MEDICINE

## 2018-07-06 PROCEDURE — 86618 LYME DISEASE ANTIBODY: CPT | Performed by: INTERNAL MEDICINE

## 2018-07-06 PROCEDURE — G0103 PSA SCREENING: HCPCS | Performed by: INTERNAL MEDICINE

## 2018-07-06 PROCEDURE — 36415 COLL VENOUS BLD VENIPUNCTURE: CPT | Performed by: INTERNAL MEDICINE

## 2018-07-06 PROCEDURE — 80061 LIPID PANEL: CPT | Performed by: INTERNAL MEDICINE

## 2018-07-06 PROCEDURE — 84165 PROTEIN E-PHORESIS SERUM: CPT | Performed by: INTERNAL MEDICINE

## 2018-07-09 LAB
ALBUMIN SERPL ELPH-MCNC: 4.1 G/DL (ref 3.7–5.1)
ALPHA1 GLOB SERPL ELPH-MCNC: 0.3 G/DL (ref 0.2–0.4)
ALPHA2 GLOB SERPL ELPH-MCNC: 0.8 G/DL (ref 0.5–0.9)
B BURGDOR DNA SPEC QL NAA+PROBE: NOT DETECTED
B-GLOBULIN SERPL ELPH-MCNC: 0.7 G/DL (ref 0.6–1)
GAMMA GLOB SERPL ELPH-MCNC: 0.6 G/DL (ref 0.7–1.6)
IGA SERPL-MCNC: 225 MG/DL (ref 70–380)
IGG SERPL-MCNC: 685 MG/DL (ref 695–1620)
IGM SERPL-MCNC: 67 MG/DL (ref 60–265)
M PROTEIN SERPL ELPH-MCNC: 0 G/DL
PROT PATTERN SERPL ELPH-IMP: ABNORMAL
PROT PATTERN SERPL IFE-IMP: ABNORMAL
SPECIMEN SOURCE: NORMAL

## 2018-07-13 ENCOUNTER — TRANSFERRED RECORDS (OUTPATIENT)
Dept: HEALTH INFORMATION MANAGEMENT | Facility: CLINIC | Age: 73
End: 2018-07-13

## 2018-07-25 ENCOUNTER — HOSPITAL ENCOUNTER (OUTPATIENT)
Dept: WOUND CARE | Facility: CLINIC | Age: 73
Discharge: HOME | End: 2018-07-25
Attending: PHYSICIAN ASSISTANT | Admitting: PHYSICIAN ASSISTANT
Payer: MEDICARE

## 2018-07-25 VITALS
WEIGHT: 210 LBS | HEART RATE: 64 BPM | TEMPERATURE: 96.4 F | BODY MASS INDEX: 26.95 KG/M2 | HEIGHT: 74 IN | DIASTOLIC BLOOD PRESSURE: 70 MMHG | SYSTOLIC BLOOD PRESSURE: 125 MMHG

## 2018-07-25 DIAGNOSIS — I83.013 VENOUS ULCER OF ANKLE, RIGHT (H): ICD-10-CM

## 2018-07-25 DIAGNOSIS — L97.319 VENOUS ULCER OF ANKLE, RIGHT (H): ICD-10-CM

## 2018-07-25 PROCEDURE — 11042 DBRDMT SUBQ TIS 1ST 20SQCM/<: CPT

## 2018-07-25 PROCEDURE — 97597 DBRDMT OPN WND 1ST 20 CM/<: CPT | Performed by: PHYSICIAN ASSISTANT

## 2018-07-25 PROCEDURE — A6454 SELF-ADHER BAND W>=3" <5"/YD: HCPCS

## 2018-07-25 PROCEDURE — A6441 PAD BAND W>=3" <5"/YD: HCPCS

## 2018-07-25 PROCEDURE — A6252 ABSORPT DRG >16 <=48 W/O BDR: HCPCS

## 2018-07-25 PROCEDURE — 97597 DBRDMT OPN WND 1ST 20 CM/<: CPT

## 2018-07-25 PROCEDURE — A6209 FOAM DRSG <=16 SQ IN W/O BDR: HCPCS

## 2018-07-25 PROCEDURE — G0463 HOSPITAL OUTPT CLINIC VISIT: HCPCS | Mod: 25

## 2018-07-25 NOTE — MR AVS SNAPSHOT
MRN:[id number]                      After Visit Summary   7/25/2018    Kaleb Pennington    MRN: [id number]           Visit Information        Provider Department      7/25/2018  2:00 PM Beth Álvarez PA-C [facility]        Your next 10 appointments already scheduled     Aug 07, 2018  9:45 AM CDT   LAB with SH LAB ONLY   [facility] ([facility])    [address] [place] [address] [phone]           Please do not eat 10-12 hours before your appointment if you are coming in fasting for labs on lipids, cholesterol, or glucose (sugar). This does not apply to pregnant women. Water, hot tea and black coffee (with nothing added) are okay. Do not drink other fluids, diet soda or chew gum.                Further instructions from your care team             [facility]  [address] [facility] [address], [place] [address]  Appointment Phone [phone] Nurse Advisors [phone]    Kaleb AVANI Gorge      1945  Keep your right leg dry. Use Cast protector when in the shower to keep dressing dry.  Nursing in clinic to do dressing changes   Wound Dressing Change: Right Medial Ankle   Cleanse wound with saline or wound cleanser  Cover wound with Acticoat 7 cut to fit the size of the wound   Followed by foam dressing  Change dressing weekly.   Compression:   Your compression wrap is a 2 layer coflex wrap and should stay on until next week.     Please remove compression dressing if toes turn blue and/or tingle and can not be relieved by raising the leg for one hour.     Please raise your legs about your heart for 30 mins 4 times a day to promote wound healing.     Beth Álvarez PA-C. July 25, 2018  Call us at [phone] if you have any questions about your wounds, have redness or swelling around your wound, have a fever of 101 or greater or if you have any other problems or concerns. We  answer the phone Monday through Friday 8 am to 4 pm, please leave a message as we check the voicemail frequently throughout the day.   Follow up with: Nurse for dressing changes weekly x2 weeks  Follow up with Provider - 3 weeks                 Care EveryWhere ID     This is your Care EveryWhere ID. This could be used by other organizations to access your [place] medical records  [id number]        Equal Access to Services     SHUKRI BENITEZ : Rey El, iveth silveira, serafin lopez, mayela sparks . So [facility] [phone].    ATENCIÓN: Si habla español, tiene a ramirez disposición servicios gratuitos de asistencia lingüística. Llame al [phone].    We comply with applicable federal civil rights laws and [place] laws. We do not discriminate on the basis of race, color, national origin, age, disability, sex, sexual orientation, or gender identity.

## 2018-07-25 NOTE — DISCHARGE INSTRUCTIONS
[facility]  [address] [facility] [address] [address]  Appointment Phone [phone] Nurse Advisors [phone]    Kaleb Pennington      1945  Keep your right leg dry. Use Cast protector when in the shower to keep dressing dry.  Nursing in clinic to do dressing changes   Wound Dressing Change: Right Medial Ankle   Cleanse wound with saline or wound cleanser  Cover wound with Acticoat 7 cut to fit the size of the wound   Followed by foam dressing  Change dressing weekly.   Compression:   Your compression wrap is a 2 layer coflex wrap and should stay on until next week.     Please remove compression dressing if toes turn blue and/or tingle and can not be relieved by raising the leg for one hour.     Please raise your legs about your heart for 30 mins 4 times a day to promote wound healing.     Beth Álvarez PA-C. July 25, 2018  Call us at [phone] if you have any questions about your wounds, have redness or swelling around your wound, have a fever of 101 or greater or if you have any other problems or concerns. We answer the phone Monday through Friday 8 am to 4 pm, please leave a message as we check the voicemail frequently throughout the day.   Follow up with: Nurse for dressing changes weekly x2 weeks  Follow up with Provider - 3 weeks

## 2018-07-26 NOTE — PROGRESS NOTES
"[facility]    HISTORY OF PRESENT ILLNESS: Kaleb Pennington is a 73 year old male who presents today for a right medial ankle wound that has been present since about April. He believe it started when he hit his leg on his bed frame. He has no other history of wounds in this area but has signs of venous disease bilaterally. Has never been diagnosed with venous disease but does have PAD worse in his left leg. Does not typically wear compression socks. 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     WOUND CARE: bacitracin    COMPRESSION: none    DATE WOUND ACQUIRED: 4/2108    PAST MEDICAL HISTORY: inflammatory AAA, moderate PAD in left leg and borderline in right leg, limb length discrepancy, low back pain    MEDICATIONS:   Current Outpatient Prescriptions   Medication     alendronate (FOSAMAX) 35 MG tablet     atorvastatin (LIPITOR) 40 MG tablet     calcium carbonate-vitamin D 600-400 MG-UNIT CHEW     CHANTIX 1 MG tablet     FOLIC ACID PO     losartan (COZAAR) 100 MG tablet     Methotrexate Sodium (METHOTREXATE PO)     metoprolol (LOPRESSOR) 25 MG tablet     nicotine (NICODERM CQ) 14 MG/24HR 24 hr patch     nicotine (NICODERM CQ) 7 MG/24HR 24 hr patch     predniSONE (DELTASONE) 10 MG tablet     varenicline (CHANTIX STARTING MONTH PAK) 0.5 MG X 11 & 1 MG X 42 tablet     No current facility-administered medications for this encounter.      REVIEW OF SYSTEMS:  CONSTITUTIONAL: Denies fevers or acute illness  ENDOCRINE: Denies diabetes    VITALS: /70  Pulse 64  Temp 96.4  F (35.8  C) (Temporal)  Ht 1.88 m (6' 2\")  Wt 95.3 kg (210 lb)  BMI 26.96 kg/m2    PHYSICAL EXAM:  GENERAL: Patient is alert and oriented and in no acute distress  INTEGUMENTARY:   WOUND ASSESSMENT #1:     Location: right medial ankle     Size: 2.5 cm x 3.0 cm with a depth of 0.1 cm    Drainage: moderate amount of serosanguinous " drainage    Wound description: dried exudate        PROCEDURE: 4% topical lidocaine was applied to the wound by the [facility]. Patient was determined to be capable of making their own medical decisions and informed consent was obtained. Using a 15 blade a surgical debridement was performed down to and including subcutaneous tissue of <20 cm. Hemostasis was achieved with pressure. The patient tolerated the procedure well.      ASSESSMENT: full-thickness, venous stasis ulcer of right lower leg with fat-layer exposed    PLAN:   1. Primary dressing: ActiCoat; Secondary dressin-layer CoFlex wrap    FOLLOW-UP: 1 week    REID SHANONN PA-C ([facility]KSTRA)

## 2018-07-31 ENCOUNTER — HOSPITAL ENCOUNTER (OUTPATIENT)
Dept: WOUND CARE | Facility: CLINIC | Age: 73
Discharge: HOME | End: 2018-07-31
Attending: PHYSICIAN ASSISTANT | Admitting: PHYSICIAN ASSISTANT
Payer: MEDICARE

## 2018-07-31 VITALS
SYSTOLIC BLOOD PRESSURE: 147 MMHG | RESPIRATION RATE: 16 BRPM | HEART RATE: 67 BPM | DIASTOLIC BLOOD PRESSURE: 71 MMHG | TEMPERATURE: 96.2 F

## 2018-07-31 DIAGNOSIS — L97.319 VENOUS ULCER OF ANKLE, RIGHT (H): ICD-10-CM

## 2018-07-31 DIAGNOSIS — I83.013 VENOUS ULCER OF ANKLE, RIGHT (H): ICD-10-CM

## 2018-07-31 PROCEDURE — A6441 PAD BAND W>=3" <5"/YD: HCPCS

## 2018-07-31 PROCEDURE — A6454 SELF-ADHER BAND W>=3" <5"/YD: HCPCS

## 2018-07-31 PROCEDURE — A6021 COLLAGEN DRESSING <=16 SQ IN: HCPCS

## 2018-07-31 PROCEDURE — 97597 DBRDMT OPN WND 1ST 20 CM/<: CPT

## 2018-07-31 NOTE — PROGRESS NOTES
[facility] [place] Nurse Note    Subject: Kaleb VARMA Gorge for nurse only visit for dressing change to right medial ankle wounds      Exam:  /71  Pulse 67  Temp 96.2  F (35.7  C) (Temporal)  Resp 16    Procedure:  Time out was called, informed consent obtained, and topical anesthetic of 4% lidocaine was applied,selective debridement was performed using a curette to remove non-viable tissue less than 20 sq cm, no bleeding occurred. Patient tolerated procedure well.  Wounds were redressed with Endoform, mepilex and coflex wrap.     Plan: Patient will return to the clinic in 1 weeks time          07/31/18 1:30 PM

## 2018-08-05 DIAGNOSIS — Z72.0 TOBACCO ABUSE: ICD-10-CM

## 2018-08-06 RX ORDER — VARENICLINE TARTRATE 1 MG/1
TABLET, FILM COATED ORAL
Qty: 180 TABLET | Refills: 0 | Status: SHIPPED | OUTPATIENT
Start: 2018-08-06 | End: 2018-11-08

## 2018-08-07 ENCOUNTER — HOSPITAL ENCOUNTER (OUTPATIENT)
Dept: WOUND CARE | Facility: CLINIC | Age: 73
Discharge: HOME | End: 2018-08-07
Attending: PHYSICIAN ASSISTANT | Admitting: PHYSICIAN ASSISTANT
Payer: MEDICARE

## 2018-08-07 VITALS
TEMPERATURE: 96 F | SYSTOLIC BLOOD PRESSURE: 134 MMHG | DIASTOLIC BLOOD PRESSURE: 64 MMHG | HEART RATE: 64 BPM | RESPIRATION RATE: 16 BRPM

## 2018-08-07 DIAGNOSIS — I83.013 VENOUS ULCER OF ANKLE, RIGHT (H): ICD-10-CM

## 2018-08-07 DIAGNOSIS — L97.319 VENOUS ULCER OF ANKLE, RIGHT (H): ICD-10-CM

## 2018-08-07 PROCEDURE — A6454 SELF-ADHER BAND W>=3" <5"/YD: HCPCS

## 2018-08-07 PROCEDURE — A6441 PAD BAND W>=3" <5"/YD: HCPCS

## 2018-08-07 PROCEDURE — 97602 WOUND(S) CARE NON-SELECTIVE: CPT

## 2018-08-07 NOTE — PROGRESS NOTES
[facility] [place] Nurse Note    Subject: Kaleb AVANI Pennington for nurse only visit for dressing change.       Exam:  /64  Pulse 64  Temp 96  F (35.6  C) (Temporal)  Resp 16    Procedure:  Time out was called, informed consent obtained, and topical anesthetic of 4% lidocaine was applied, non-selective debridement was performed using a  to remove non-viable tissue no bleeding occurred. Patient tolerated procedure well.  Wounds were redressed with endoform and 2 layer coflex wrap.     Plan: Patient will return to the clinic in 1 weeks time    No images are attached to the encounter.    08/07/18 2:14 PM

## 2018-08-14 ENCOUNTER — HOSPITAL ENCOUNTER (OUTPATIENT)
Dept: WOUND CARE | Facility: CLINIC | Age: 73
Discharge: HOME | End: 2018-08-14
Attending: PHYSICIAN ASSISTANT | Admitting: PHYSICIAN ASSISTANT
Payer: MEDICARE

## 2018-08-14 VITALS
HEART RATE: 74 BPM | DIASTOLIC BLOOD PRESSURE: 80 MMHG | RESPIRATION RATE: 16 BRPM | SYSTOLIC BLOOD PRESSURE: 162 MMHG | TEMPERATURE: 97.6 F

## 2018-08-14 DIAGNOSIS — S81.809D OPEN WOUND OF LOWER EXTREMITY, UNSPECIFIED LATERALITY, SUBSEQUENT ENCOUNTER: Primary | ICD-10-CM

## 2018-08-14 DIAGNOSIS — L97.319 VENOUS ULCER OF ANKLE, RIGHT (H): ICD-10-CM

## 2018-08-14 DIAGNOSIS — I83.013 VENOUS ULCER OF ANKLE, RIGHT (H): ICD-10-CM

## 2018-08-14 DIAGNOSIS — L98.491 CHRONIC SKIN ULCER, LIMITED TO BREAKDOWN OF SKIN (H): ICD-10-CM

## 2018-08-14 DIAGNOSIS — I83.90 VARICOSE VEIN OF LEG: ICD-10-CM

## 2018-08-14 PROCEDURE — 97602 WOUND(S) CARE NON-SELECTIVE: CPT

## 2018-08-14 PROCEDURE — 99213 OFFICE O/P EST LOW 20 MIN: CPT | Performed by: PHYSICIAN ASSISTANT

## 2018-08-14 NOTE — DISCHARGE INSTRUCTIONS
[facility]  [address] [facility] [address] [address]  Appointment Phone [phone] Nurse Advisors [phone]    Kaleb Pennington      1945    Wound Dressing Change:Right Leg   Cleanse wound with:soap and water  Cover wound with wound gel   Cover wound with band-aid  Change dressing daily.  Compression:   You have a compression wrap Spandigrip E is supposed to be removed at night and put back on first thing in the morning.   Please remove compression dressing if toes turn blue and/or tingle and can not be relieved by raising the leg for one hour.     YOU NEED TO WEAR COMPRESSION SOCKS EVERYDAY!     Beth Álvarez PA-C. August 14, 2018    Call us at [phone] if you have any questions about your wounds, have redness or swelling around your wound, have a fever of 101 or greater or if you have any other problems or concerns. We answer the phone Monday through Friday 8 am to 4 pm, please leave a message as we check the voicemail frequently throughout the day.     Follow up with Provider - 2 weeks

## 2018-08-14 NOTE — MR AVS SNAPSHOT
MRN:[id number]                      After Visit Summary   8/14/2018    Kaleb Pennington    MRN: [id number]           Visit Information        Provider Department      8/14/2018 10:15 AM Beth Álvarez PA-C [facility] [place]          Further instructions from your care team             [facility]  [address] [facility] [address], [place] [address]  Appointment Phone [phone] Nurse Advisors [phone]    Kaleb AVANI Gorge      1945    Wound Dressing Change:Right Leg   Cleanse wound with:soap and water  Cover wound with wound gel   Cover wound with band-aid  Change dressing daily.  Compression:   You have a compression wrap Spandigrip E is supposed to be removed at night and put back on first thing in the morning.   Please remove compression dressing if toes turn blue and/or tingle and can not be relieved by raising the leg for one hour.     YOU NEED TO WEAR COMPRESSION SOCKS EVERYDAY!     Beth Álvarez PA-C. August 14, 2018    Call us at [phone] if you have any questions about your wounds, have redness or swelling around your wound, have a fever of 101 or greater or if you have any other problems or concerns. We answer the phone Monday through Friday 8 am to 4 pm, please leave a message as we check the voicemail frequently throughout the day.     Follow up with Provider - 2 weeks           Care EveryWhere ID     This is your Care EveryWhere ID. This could be used by other organizations to access your [place] medical records  [id number]        Equal Access to Services     [facility] CenterSAKINA AH: Hadii aad ku hadasho Soomaali, waaxda luqadaha, qaybta kaalmada adeegyada, mayela araya. So [facility] [phone].    ATENCIÓN: Si habla español, tiene a ramirez disposición servicios gratuitos de asistencia lingüística. Llame al [phone].    We comply with applicable federal civil rights laws and  [place] laws. We do not discriminate on the basis of race, color, national origin, age, disability, sex, sexual orientation, or gender identity.

## 2018-08-15 ENCOUNTER — HOSPITAL ENCOUNTER (EMERGENCY)
Facility: CLINIC | Age: 73
Discharge: HOME | End: 2018-08-15
Attending: EMERGENCY MEDICINE | Admitting: EMERGENCY MEDICINE
Payer: MEDICARE

## 2018-08-15 ENCOUNTER — HOSPITAL ENCOUNTER (OUTPATIENT)
Dept: LAB | Facility: CLINIC | Age: 73
Discharge: HOME | End: 2018-08-15
Attending: INTERNAL MEDICINE | Admitting: INTERNAL MEDICINE
Payer: MEDICARE

## 2018-08-15 ENCOUNTER — APPOINTMENT (OUTPATIENT)
Dept: CT IMAGING | Facility: CLINIC | Age: 73
End: 2018-08-15
Attending: EMERGENCY MEDICINE
Payer: MEDICARE

## 2018-08-15 ENCOUNTER — TELEPHONE (OUTPATIENT)
Dept: OTHER | Facility: CLINIC | Age: 73
End: 2018-08-15

## 2018-08-15 VITALS
DIASTOLIC BLOOD PRESSURE: 65 MMHG | OXYGEN SATURATION: 98 % | SYSTOLIC BLOOD PRESSURE: 141 MMHG | WEIGHT: 205 LBS | HEART RATE: 72 BPM | TEMPERATURE: 97.9 F | HEIGHT: 74 IN | BODY MASS INDEX: 26.31 KG/M2 | RESPIRATION RATE: 18 BRPM

## 2018-08-15 DIAGNOSIS — S09.90XA CLOSED HEAD INJURY, INITIAL ENCOUNTER: ICD-10-CM

## 2018-08-15 DIAGNOSIS — I77.6 AORTITIS (H): ICD-10-CM

## 2018-08-15 LAB
BASOPHILS # BLD AUTO: 0 10E9/L (ref 0–0.2)
BASOPHILS NFR BLD AUTO: 0.2 %
CRP SERPL-MCNC: <2.9 MG/L (ref 0–8)
DIFFERENTIAL METHOD BLD: ABNORMAL
EOSINOPHIL # BLD AUTO: 0.2 10E9/L (ref 0–0.7)
EOSINOPHIL NFR BLD AUTO: 2.3 %
ERYTHROCYTE [DISTWIDTH] IN BLOOD BY AUTOMATED COUNT: 15 % (ref 10–15)
ERYTHROCYTE [SEDIMENTATION RATE] IN BLOOD BY WESTERGREN METHOD: 7 MM/H (ref 0–20)
HCT VFR BLD AUTO: 41.2 % (ref 40–53)
HGB BLD-MCNC: 13.8 G/DL (ref 13.3–17.7)
IMM GRANULOCYTES # BLD: 0.1 10E9/L (ref 0–0.4)
IMM GRANULOCYTES NFR BLD: 0.6 %
LYMPHOCYTES # BLD AUTO: 1.7 10E9/L (ref 0.8–5.3)
LYMPHOCYTES NFR BLD AUTO: 16.9 %
MCH RBC QN AUTO: 33.5 PG (ref 26.5–33)
MCHC RBC AUTO-ENTMCNC: 33.5 G/DL (ref 31.5–36.5)
MCV RBC AUTO: 100 FL (ref 78–100)
MONOCYTES # BLD AUTO: 0.5 10E9/L (ref 0–1.3)
MONOCYTES NFR BLD AUTO: 5.1 %
NEUTROPHILS # BLD AUTO: 7.5 10E9/L (ref 1.6–8.3)
NEUTROPHILS NFR BLD AUTO: 74.9 %
NRBC # BLD AUTO: 0 10*3/UL
NRBC BLD AUTO-RTO: 0 /100
PLATELET # BLD AUTO: 198 10E9/L (ref 150–450)
RBC # BLD AUTO: 4.12 10E12/L (ref 4.4–5.9)
WBC # BLD AUTO: 10 10E9/L (ref 4–11)

## 2018-08-15 PROCEDURE — 85025 COMPLETE CBC W/AUTO DIFF WBC: CPT | Performed by: INTERNAL MEDICINE

## 2018-08-15 PROCEDURE — 70450 CT HEAD/BRAIN W/O DYE: CPT

## 2018-08-15 PROCEDURE — 36415 COLL VENOUS BLD VENIPUNCTURE: CPT | Performed by: INTERNAL MEDICINE

## 2018-08-15 PROCEDURE — 86140 C-REACTIVE PROTEIN: CPT | Performed by: INTERNAL MEDICINE

## 2018-08-15 PROCEDURE — 85652 RBC SED RATE AUTOMATED: CPT | Performed by: INTERNAL MEDICINE

## 2018-08-15 PROCEDURE — 99284 EMERGENCY DEPT VISIT MOD MDM: CPT | Mod: 25

## 2018-08-15 ASSESSMENT — ENCOUNTER SYMPTOMS
NECK PAIN: 0
BACK PAIN: 1
WEAKNESS: 1
HEADACHES: 0

## 2018-08-15 NOTE — ED AVS SNAPSHOT
Emergency Department    [address] [place] [address]    Phone:  [phone]    Fax:  [phone]                                       Kaleb Pennington   MRN: [id number]    Department:   Emergency Department   Date of Visit:  8/15/2018           After Visit Summary Signature Page     I have received my discharge instructions, and my questions have been answered. I have discussed any challenges I see with this plan with the nurse or doctor.    ..........................................................................................................................................  Patient/Patient Representative Signature      ..........................................................................................................................................  Patient Representative Print Name and Relationship to Patient    ..................................................               ................................................  Date                                            Time    ..........................................................................................................................................  Reviewed by Signature/Title    ...................................................              ..............................................  Date                                                            Time
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
## 2018-08-15 NOTE — ED AVS SNAPSHOT
Emergency Department    [address] [facility] [address]    Phone:  [phone]    Fax:  [phone]                                       Kaleb Pennington   MRN: [id number]    Department:   Emergency Department   Date of Visit:  8/15/2018           Patient Information     Date Of Birth          1945        Your diagnoses for this visit were:     Closed head injury, initial encounter        You were seen by Jorge Bates MD.      Follow-up Information     Follow up with Christopher Henry MD. Schedule an appointment as soon as possible for a visit in 3 days.    Specialty:  Cardiology    Why:  As needed    Contact information:    [address] [address] [phone]          Discharge Instructions       Discharge Instructions  Head Injury    You have been seen today for a head injury. Your evaluation included a history and physical examination. You may have had a CT (CAT) scan performed, though most head injuries do not require a scan. Based on this evaluation, your provider today does not feel that your head injury is serious.    Generally, every Emergency Department visit should have a follow-up clinic visit with either a primary or a specialty clinic/provider. Please follow-up as instructed by your emergency provider today.  Return to the Emergency Department if:    You are confused or you are not acting right.    Your headache gets worse or you start to have a really bad headache even with your recommended treatment plan.    You vomit (throw up) more than once.    You have a seizure.    You have trouble walking.    You have weakness or paralysis (cannot move) in an arm or a leg.    You have blood or fluid coming from your ears or nose.    You have new symptoms or anything that worries you.    Sleeping:  It is okay for you to sleep, but someone should wake you up if instructed by your provider, and someone should check on you at your usual time to wake up.      Activity:    Do not drive for at least 24 hours.    Do not drive if you have dizzy spells or trouble concentrating, or remembering things.    Do not return to any contact sports until cleared by your regular provider.     MORE INFORMATION:    Concussion:  A concussion is a minor head injury that may cause temporary problems with the way the brain works. Although concussions are important, they are generally not an emergency or a reason that a person needs to be hospitalized. Some concussion symptoms include confusion, amnesia (forgetful), nausea (sick to your stomach) and vomiting (throwing up), dizziness, fatigue, memory or concentration problems, irritability and sleep problems. For most people, concussions are mild and temporary but some will have more severe and persistent symptoms that require on-going care and treatment.  CT Scans: Your evaluation today may have included a CT scan (CAT scan) to look for things like bleeding or a skull fracture (broken bone).  CT scans involve radiation and too many CT scans can cause serious health problems like cancer, especially in children.  Because of this, your provider may not have ordered a CT scan today if they think you are at low risk for a serious or life threatening problem.    If you were given a prescription for medicine here today, be sure to read all of the information (including the package insert) that comes with your prescription.  This will include important information about the medicine, its side effects, and any warnings that you need to know about.  The pharmacist who fills the prescription can provide more information and answer questions you may have about the medicine.  If you have questions or concerns that the pharmacist cannot address, please call or return to the Emergency Department.     Remember that you can always come back to the Emergency Department if you are not able to see your regular provider in the amount of time listed above, if  you get any new symptoms, or if there is anything that worries you.      Your next 10 appointments already scheduled     Aug 29, 2018 10:30 AM CDT   Return Visit with Beth Álvarez PA-C   [facility] [place] ([facility])    [address]  [address]  [place] [address] [phone]              24 Hour Appointment Hotline       To make an appointment at any [facility] clinic, call [phone] ([phone]). If you don't have a family doctor or clinic, we will help you find one. [facility] clinics are conveniently located to serve the needs of you and your family.             Review of your medicines      Our records show that you are taking the medicines listed below. If these are incorrect, please call your family doctor or clinic.        Dose / Directions Last dose taken    alendronate 35 MG tablet   Commonly known as:  FOSAMAX   Dose:  35 mg   Quantity:  13 tablet        Take 1 tablet (35 mg) by mouth every 7 days   Refills:  3        atorvastatin 40 MG tablet   Commonly known as:  LIPITOR   Dose:  40 mg   Quantity:  90 tablet        Take 1 tablet (40 mg) by mouth daily   Refills:  3        calcium carbonate-vitamin D 600-400 MG-UNIT Chew   Dose:  1 chew tab   Quantity:  180 tablet        Take 1 chew tab by mouth 2 times daily   Refills:  3        FOLIC ACID PO   Dose:  1 mg        Take 1 mg by mouth daily   Refills:  0        losartan 100 MG tablet   Commonly known as:  COZAAR   Dose:  100 mg   Quantity:  90 tablet        Take 1 tablet (100 mg) by mouth daily   Refills:  3        METHOTREXATE PO   Dose:  6 tablet        Take 6 tablets by mouth once a week   Refills:  0        metoprolol tartrate 25 MG tablet   Commonly known as:  LOPRESSOR   Dose:  12.5 mg   Quantity:  90 tablet        Take 0.5 tablets (12.5 mg) by mouth 2 times daily   Refills:  3        * nicotine 14 MG/24HR 24 hr patch   Commonly known as:  NICODERM CQ   Dose:  1 patch   Quantity:  30  patch        Place 1 patch onto the skin every 24 hours   Refills:  0        * nicotine 7 MG/24HR 24 hr patch   Commonly known as:  NICODERM CQ   Dose:  1 patch   Quantity:  30 patch        Place 1 patch onto the skin every 24 hours   Refills:  0        order for DME   Quantity:  3 Device        [employer]ression Stock[employer] Phone #[phone] Fax #[phone] Ready To Wear Knee High Compression Stockings 20-30 mmHg Length of Need: Life Time # of Pairs 3   Refills:  0        predniSONE 10 MG tablet   Commonly known as:  DELTASONE   Dose:  10 mg   Quantity:  150 tablet        Take 1 tablet (10 mg) by mouth daily   Refills:  0        * varenicline 0.5 MG X 11 & 1 MG X 42 tablet   Commonly known as:  CHANTIX STARTING MONTH PAK   Quantity:  53 tablet        Take 0.5 mg tab daily for 3 days, then 0.5 mg tab twice daily for 4 days, then 1 mg twice daily.   Refills:  0        * CHANTIX 1 MG tablet   Quantity:  180 tablet   Generic drug:  varenicline        TAKE 1 TABLET BY MOUTH TWICE DAILY   Refills:  0        * Notice:  This list has 4 medication(s) that are the same as other medications prescribed for you. Read the directions carefully, and ask your doctor or other care provider to review them with you.            Procedures and tests performed during your visit     CT Head w/o Contrast      Orders Needing Specimen Collection     None      Pending Results     Date and Time Order Name Status Description    8/15/2018 1728 CT Head w/o Contrast Preliminary             Pending Culture Results     No orders found from 8/13/2018 to 8/16/2018.            Pending Results Instructions     If you had any lab results that were not finalized at the time of your Discharge, you can call the ED Lab Result RN at [phone]. You will be contacted by this team for any positive Lab results or changes in treatment. The nurses are available 7 days a week from 10A to 6:30P.  You can leave a message 24 hours per day and they will return  your call.        Test Results From Your Hospital Stay        8/15/2018  6:02 PM      Narrative     CT SCAN OF THE HEAD WITHOUT CONTRAST   8/15/2018 5:55 PM     HISTORY: Closed head injury, evaluate for trauma.    TECHNIQUE:  Axial images of the head and coronal reformations without  IV contrast material. Radiation dose for this scan was reduced using  automated exposure control, adjustment of the mA and/or kV according  to patient size, or iterative reconstruction technique.    COMPARISON: None.    FINDINGS: There is no evidence of intracranial hemorrhage, mass, acute  infarct or anomaly. There is generalized atrophy of the brain. There  is low attenuation in the white matter of the cerebral hemispheres  consistent with sequelae of small vessel ischemic disease. Ventricular  size is within normal limits without evidence of hydrocephalus.     The visualized portions of the sinuses and mastoids appear normal. The  bony calvarium and bones of the skull base appear intact.         Impression     IMPRESSION:     1. No evidence of acute intracranial hemorrhage, mass, or herniation.  2. There is generalized atrophy of the brain. White matter changes are  present in the cerebral hemispheres that are consistent with small  vessel ischemic disease in this age patient.                  Clinical Quality Measure: Blood Pressure Screening     Your blood pressure was checked while you were in the emergency department today. The last reading we obtained was  BP: 141/65 . Please read the guidelines below about what these numbers mean and what you should do about them.  If your systolic blood pressure (the top number) is less than 120 and your diastolic blood pressure (the bottom number) is less than 80, then your blood pressure is normal. There is nothing more that you need to do about it.  If your systolic blood pressure (the top number) is 120-139 or your diastolic blood pressure (the bottom number) is 80-89, your blood pressure  may be higher than it should be. You should have your blood pressure rechecked within a year by a primary care provider.  If your systolic blood pressure (the top number) is 140 or greater or your diastolic blood pressure (the bottom number) is 90 or greater, you may have high blood pressure. High blood pressure is treatable, but if left untreated over time it can put you at risk for heart attack, stroke, or kidney failure. You should have your blood pressure rechecked by a primary care provider within the next 4 weeks.  If your provider in the emergency department today gave you specific instructions to follow-up with your doctor or provider even sooner than that, you should follow that instruction and not wait for up to 4 weeks for your follow-up visit.        Thank you for choosing [facility]       Thank you for choosing [facility] for your care. Our goal is always to provide you with excellent care. Hearing back from our patients is one way we can continue to improve our services. Please take a few minutes to complete the written survey that you may receive in the mail after you visit with us. Thank you!        Care EveryWhere ID     This is your Care EveryWhere ID. This could be used by other organizations to access your [facility] medical records  [id number]        Equal Access to Services     SHUKRI BENITEZ : Rey El, iveth silveira, mayela jorge . So [facility] [phone].    ATENCIÓN: Si habla español, tiene a ramirez disposición servicios gratuitos de asistencia lingüística. Paragame al [phone].    We comply with applicable federal civil rights laws and [place] laws. We do not discriminate on the basis of race, color, national origin, age, disability, sex, sexual orientation, or gender identity.            After Visit Summary       This is your record. Keep this with you and show to your community pharmacist(s) and doctor(s) at your next  visit.

## 2018-08-15 NOTE — TELEPHONE ENCOUNTER
Called patient back and advised him to go into the ER to be evaluated for on-going symptoms since his first fall. Patient verbalized understanding and will go into the ER.    Kacie ADAMN, RN

## 2018-08-23 ENCOUNTER — TELEPHONE (OUTPATIENT)
Dept: WOUND CARE | Facility: CLINIC | Age: 73
End: 2018-08-23

## 2018-08-23 NOTE — TELEPHONE ENCOUNTER
Pt called with various questions regarding visit this week. He was not able to find any soap without fragrance but did find hibiclens for washing his wound. He wanted to verify that he needs multiple pairs of compression stockings and was assured that having multiple is good because they are washed nightly. Pt also wondering how long he will need to wear compression and was told likely the rest of his life unless venous circulation improved d/t surgery. Pt verbalized understanding to all above and will RTC 8/29/18.

## 2018-08-24 DIAGNOSIS — I10 ESSENTIAL HYPERTENSION: ICD-10-CM

## 2018-08-24 RX ORDER — METOPROLOL TARTRATE 25 MG/1
TABLET, FILM COATED ORAL
Qty: 90 TABLET | Refills: 2 | Status: SHIPPED | OUTPATIENT
Start: 2018-08-24 | End: 2019-04-19

## 2018-08-29 ENCOUNTER — HOSPITAL ENCOUNTER (OUTPATIENT)
Dept: WOUND CARE | Facility: CLINIC | Age: 73
Discharge: HOME | End: 2018-08-29
Attending: SURGERY | Admitting: SURGERY
Payer: MEDICARE

## 2018-08-29 VITALS — HEART RATE: 66 BPM | SYSTOLIC BLOOD PRESSURE: 157 MMHG | DIASTOLIC BLOOD PRESSURE: 69 MMHG | TEMPERATURE: 97.3 F

## 2018-08-29 DIAGNOSIS — I83.013 VENOUS ULCER OF ANKLE, RIGHT (H): ICD-10-CM

## 2018-08-29 DIAGNOSIS — L97.319 VENOUS ULCER OF ANKLE, RIGHT (H): ICD-10-CM

## 2018-08-29 PROCEDURE — 99212 OFFICE O/P EST SF 10 MIN: CPT | Performed by: PHYSICIAN ASSISTANT

## 2018-08-29 PROCEDURE — G0463 HOSPITAL OUTPT CLINIC VISIT: HCPCS

## 2018-08-29 NOTE — DISCHARGE INSTRUCTIONS
[facility]  [address] [facility] [address] [address]  Appointment Phone [phone] Nurse Advisors [phone]    Kaleb Pennington      1945  Your wound is Healed!! Dressings are no longer required. Apply lotion to keep skin hydrated  You need to wear compression socks everyday to prevent this from re-occurring.  Compression:   You have a compression socks is supposed to be removed at night and put back on first thing in the morning.   Please remove compression dressing if toes turn blue and/or tingle and can not be relieved by raising the leg for one hour.          Beth Álvarez PA-C. August 29, 2018    Call us at [phone] if you have any questions about your wounds, have redness or swelling around your wound, have a fever of 101 or greater or if you have any other problems or concerns. We answer the phone Monday through Friday 8 am to 4 pm, please leave a message as we check the voicemail frequently throughout the day.     Follow up with Provider - Dr. Henry as scheduled

## 2018-08-29 NOTE — MR AVS SNAPSHOT
MRN:[id number]                      After Visit Summary   8/29/2018    Kaleb Pennington    MRN: [id number]           Visit Information        Provider Department      8/29/2018 10:30 AM Beth Álvarez PA-C [facility]        Your next 10 appointments already scheduled     Sep 27, 2018 10:30 AM CDT   LAB with SH LAB ONLY   [facility] ([facility])    [address] [place] [address] [phone]           Please do not eat 10-12 hours before your appointment if you are coming in fasting for labs on lipids, cholesterol, or glucose (sugar). This does not apply to pregnant women. Water, hot tea and black coffee (with nothing added) are okay. Do not drink other fluids, diet soda or chew gum.                Further instructions from your care team             [facility] [place]  [address] [facility] [address], [place] [address]  Appointment Phone [phone] Nurse Advisors [phone]    Kaleb Pennington      1945  Your wound is Healed!! Dressings are no longer required. Apply lotion to keep skin hydrated  You need to wear compression socks everyday to prevent this from re-occurring.  Compression:   You have a compression socks is supposed to be removed at night and put back on first thing in the morning.   Please remove compression dressing if toes turn blue and/or tingle and can not be relieved by raising the leg for one hour.          Beth Álvarez PA-C. August 29, 2018    Call us at [phone] if you have any questions about your wounds, have redness or swelling around your wound, have a fever of 101 or greater or if you have any other problems or concerns. We answer the phone Monday through Friday 8 am to 4 pm, please leave a message as we check the voicemail frequently throughout the day.     Follow up with Provider - Dr. Henry as scheduled         Care EveryWhere ID     This is  your Care EveryWhere ID. This could be used by other organizations to access your [facility] medical records  [id number]        Equal Access to Services     SHUKRI BENITEZ : Rey El, iveth silveira, serafin lopez, mayela araya. So [facility] [phone].    ATENCIÓN: Si habla español, tiene a ramirez disposición servicios gratuitos de asistencia lingüística. Llame al [phone].    We comply with applicable federal civil rights laws and [place] laws. We do not discriminate on the basis of race, color, national origin, age, disability, sex, sexual orientation, or gender identity.

## 2018-08-29 NOTE — PROGRESS NOTES
[facility]    HISTORY OF PRESENT ILLNESS: Kaleb Pennington is a 73 year old male who returns today for a right medial ankle wound that has been present since about April. He believe it started when he hit his leg on his bed frame. He has no other history of wounds in this area but has signs of venous disease bilaterally. Has never been diagnosed with venous disease but does have PAD worse in his left leg. Does not typically wear compression socks. 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     For the past few weeks he has been coming to our office for 2-layer dynamic compression wraps. He had responded well to this therefore we transitioned to WounDres and SpandaGrip changed at home. Today he appears completely healed.     DATE WOUND ACQUIRED: 4/2108    PAST MEDICAL HISTORY: inflammatory AAA, moderate PAD in left leg and borderline in right leg, limb length discrepancy, low back pain    MEDICATIONS:   Current Outpatient Prescriptions   Medication     alendronate (FOSAMAX) 35 MG tablet     atorvastatin (LIPITOR) 40 MG tablet     calcium carbonate-vitamin D 600-400 MG-UNIT CHEW     CHANTIX 1 MG tablet     FOLIC ACID PO     losartan (COZAAR) 100 MG tablet     Methotrexate Sodium (METHOTREXATE PO)     metoprolol tartrate (LOPRESSOR) 25 MG tablet     nicotine (NICODERM CQ) 14 MG/24HR 24 hr patch     nicotine (NICODERM CQ) 7 MG/24HR 24 hr patch     order for DME     predniSONE (DELTASONE) 10 MG tablet     varenicline (CHANTIX STARTING MONTH PAK) 0.5 MG X 11 & 1 MG X 42 tablet     No current facility-administered medications for this encounter.      REVIEW OF SYSTEMS:  CONSTITUTIONAL: Denies fevers or acute illness  ENDOCRINE: Denies diabetes    VITALS: /69  Pulse 66  Temp 97.3  F (36.3  C) (Tympanic)    PHYSICAL EXAM:  GENERAL: Patient is alert and oriented and in no acute distress  INTEGUMENTARY: right  medial ankle with new epithelium            PROCEDURE: None indicated    ASSESSMENT: resolved venous stasis ulcer of right lower leg with fat-layer exposed    PLAN:   1. Compression socks indefinitely  2. Discontinue dressings      FOLLOW-UP: PRN    REID (DINO) CECCONI PA-C

## 2018-08-29 NOTE — PROGRESS NOTES
Patient arrived for wound care visit. Certified Wound Care Nurse time spent evaluating patient record, completed a full evaluation and documented healed wound(s) & parth-wound skin; provided recommendation based on treatment plan. Applied dressing, reviewed discharge instructions, patient education, and discussed plan of care with appropriate medical team staff members and patient and/or family members.

## 2018-09-06 ENCOUNTER — TRANSFERRED RECORDS (OUTPATIENT)
Dept: HEALTH INFORMATION MANAGEMENT | Facility: CLINIC | Age: 73
End: 2018-09-06

## 2018-09-14 ENCOUNTER — TELEPHONE (OUTPATIENT)
Dept: WOUND CARE | Facility: CLINIC | Age: 73
End: 2018-09-14

## 2018-09-14 DIAGNOSIS — L97.319 VENOUS ULCER OF ANKLE, RIGHT (H): ICD-10-CM

## 2018-09-14 DIAGNOSIS — S81.809D OPEN WOUND OF LOWER EXTREMITY, UNSPECIFIED LATERALITY, SUBSEQUENT ENCOUNTER: ICD-10-CM

## 2018-09-14 DIAGNOSIS — L98.491 CHRONIC SKIN ULCER, LIMITED TO BREAKDOWN OF SKIN (H): ICD-10-CM

## 2018-09-14 DIAGNOSIS — I83.90 VARICOSE VEIN OF LEG: ICD-10-CM

## 2018-09-14 DIAGNOSIS — I83.013 VENOUS ULCER OF ANKLE, RIGHT (H): ICD-10-CM

## 2018-09-14 NOTE — TELEPHONE ENCOUNTER
Received call from Idania Dorado stating Patient needs rx for 30-40 compression and notes for insurance to cover compression socking.

## 2018-09-22 DIAGNOSIS — M85.80 OSTEOPENIA, UNSPECIFIED LOCATION: ICD-10-CM

## 2018-09-24 RX ORDER — ALENDRONATE SODIUM 35 MG/1
TABLET ORAL
Qty: 4 TABLET | Refills: 1 | Status: SHIPPED | OUTPATIENT
Start: 2018-09-24 | End: 2018-11-19

## 2018-09-24 NOTE — TELEPHONE ENCOUNTER
"Last Written Prescription Date:  10/12/17  Last Fill Quantity: 13,  # refills: 3   Last office visit: No previous visit found with prescribing provider:  10/12/17   Future Office Visit:    Requested Prescriptions   Pending Prescriptions Disp Refills     alendronate (FOSAMAX) 35 MG tablet [Pharmacy Med Name: Alendronate Sodium Oral Tablet 35 MG] 4 tablet 2     Sig: Take 1 tablet (35 mg) by mouth every 7 days    Bisphosphonates Passed    9/22/2018  7:02 AM       Passed - Recent (12 mo) or future (30 days) visit within the authorizing provider's specialty    Patient had office visit in the last 12 months or has a visit in the next 30 days with authorizing provider or within the authorizing provider's specialty.  See \"Patient Info\" tab in inbasket, or \"Choose Columns\" in Meds & Orders section of the refill encounter.           Passed - Dexa on file within past 2 years    Please review last Dexa result.          Passed - Patient is age 18 or older       Passed - Normal serum creatinine on file within past 12 months    Recent Labs   Lab Test  07/06/18   1023   03/03/17   1330   CR  0.94   < >   --    CREAT   --    --   1.0    < > = values in this interval not displayed.             Prescription approved per [facility] [place] [place] Refill Protocol.  Mica Keys, RN, BSN    "

## 2018-09-26 ENCOUNTER — TELEPHONE (OUTPATIENT)
Dept: OTHER | Facility: CLINIC | Age: 73
End: 2018-09-26

## 2018-09-26 NOTE — TELEPHONE ENCOUNTER
Called patient, he has multiple appointments this week  He will call next week to schedule appointment for the below  Mica Keys RN, BSN

## 2018-09-26 NOTE — TELEPHONE ENCOUNTER
Patient would like to know based on wound care suggestion (seen for non healing ulcer) , if he needs to wear compression socks per life and would like PCP to advise  Mica Keys, RN, BSN

## 2018-09-26 NOTE — TELEPHONE ENCOUNTER
I need to see him to examine the ulcer before rendering an opinion abut this. Please schedule to see me next regularly available appt.

## 2018-09-27 ENCOUNTER — HOSPITAL ENCOUNTER (OUTPATIENT)
Dept: LAB | Facility: CLINIC | Age: 73
Discharge: HOME | End: 2018-09-27
Attending: INTERNAL MEDICINE | Admitting: INTERNAL MEDICINE
Payer: MEDICARE

## 2018-09-27 ENCOUNTER — TRANSFERRED RECORDS (OUTPATIENT)
Dept: HEALTH INFORMATION MANAGEMENT | Facility: CLINIC | Age: 73
End: 2018-09-27

## 2018-09-27 DIAGNOSIS — I77.6 AORTITIS (H): ICD-10-CM

## 2018-09-27 LAB
BASOPHILS # BLD AUTO: 0 10E9/L (ref 0–0.2)
BASOPHILS NFR BLD AUTO: 0.3 %
CREAT SERPL-MCNC: 0.89 MG/DL (ref 0.66–1.25)
CRP SERPL-MCNC: <2.9 MG/L (ref 0–8)
DIFFERENTIAL METHOD BLD: ABNORMAL
EOSINOPHIL # BLD AUTO: 0.2 10E9/L (ref 0–0.7)
EOSINOPHIL NFR BLD AUTO: 2.5 %
ERYTHROCYTE [DISTWIDTH] IN BLOOD BY AUTOMATED COUNT: 14.5 % (ref 10–15)
ERYTHROCYTE [SEDIMENTATION RATE] IN BLOOD BY WESTERGREN METHOD: 10 MM/H (ref 0–20)
GFR SERPL CREATININE-BSD FRML MDRD: 84 ML/MIN/1.7M2
HCT VFR BLD AUTO: 40.9 % (ref 40–53)
HGB BLD-MCNC: 13.6 G/DL (ref 13.3–17.7)
IMM GRANULOCYTES # BLD: 0 10E9/L (ref 0–0.4)
IMM GRANULOCYTES NFR BLD: 0.3 %
LYMPHOCYTES # BLD AUTO: 1.6 10E9/L (ref 0.8–5.3)
LYMPHOCYTES NFR BLD AUTO: 20.9 %
MCH RBC QN AUTO: 33.4 PG (ref 26.5–33)
MCHC RBC AUTO-ENTMCNC: 33.3 G/DL (ref 31.5–36.5)
MCV RBC AUTO: 101 FL (ref 78–100)
MONOCYTES # BLD AUTO: 0.6 10E9/L (ref 0–1.3)
MONOCYTES NFR BLD AUTO: 8.5 %
NEUTROPHILS # BLD AUTO: 5.1 10E9/L (ref 1.6–8.3)
NEUTROPHILS NFR BLD AUTO: 67.5 %
NRBC # BLD AUTO: 0 10*3/UL
NRBC BLD AUTO-RTO: 0 /100
PLATELET # BLD AUTO: 222 10E9/L (ref 150–450)
RBC # BLD AUTO: 4.07 10E12/L (ref 4.4–5.9)
WBC # BLD AUTO: 7.6 10E9/L (ref 4–11)

## 2018-09-27 PROCEDURE — 85652 RBC SED RATE AUTOMATED: CPT | Performed by: INTERNAL MEDICINE

## 2018-09-27 PROCEDURE — 82565 ASSAY OF CREATININE: CPT | Performed by: INTERNAL MEDICINE

## 2018-09-27 PROCEDURE — 85025 COMPLETE CBC W/AUTO DIFF WBC: CPT | Performed by: INTERNAL MEDICINE

## 2018-09-27 PROCEDURE — 86140 C-REACTIVE PROTEIN: CPT | Performed by: INTERNAL MEDICINE

## 2018-09-27 PROCEDURE — 36415 COLL VENOUS BLD VENIPUNCTURE: CPT | Performed by: INTERNAL MEDICINE

## 2018-09-28 ENCOUNTER — HOSPITAL ENCOUNTER (OUTPATIENT)
Dept: LAB | Facility: CLINIC | Age: 73
Discharge: HOME | End: 2018-09-28
Attending: INTERNAL MEDICINE | Admitting: INTERNAL MEDICINE
Payer: MEDICARE

## 2018-09-28 DIAGNOSIS — I77.6 AORTITIS (H): ICD-10-CM

## 2018-09-28 LAB
COLLECT DURATION TIME UR: 24 H
CREAT CL 24H UR+SERPL-VRATE: 107 ML/MIN
CREAT CL/1.73 SQ M 24H UR+SERPL-ARVRAT: 84 ML/MIN/1.7M2 (ref 110–180)
CREAT SERPL-MCNC: 0.89 MG/DL (ref 0.66–1.25)
CREAT UR-MCNC: 186 MG/DL
HEIGHT IN CM: 188 CM
SPECIMEN VOL UR: 740 ML

## 2018-09-28 PROCEDURE — 81050 URINALYSIS VOLUME MEASURE: CPT | Performed by: INTERNAL MEDICINE

## 2018-09-28 PROCEDURE — 00000095 ZZHCL STATISTIC CREATININE CLEARANCE: Performed by: INTERNAL MEDICINE

## 2018-10-15 DIAGNOSIS — M85.80 OSTEOPENIA, UNSPECIFIED LOCATION: ICD-10-CM

## 2018-11-08 ENCOUNTER — TELEPHONE (OUTPATIENT)
Dept: OTHER | Facility: CLINIC | Age: 73
End: 2018-11-08

## 2018-11-08 DIAGNOSIS — Z72.0 TOBACCO ABUSE: ICD-10-CM

## 2018-11-08 RX ORDER — VARENICLINE TARTRATE 1 MG/1
TABLET, FILM COATED ORAL
Qty: 180 TABLET | Refills: 0 | Status: SHIPPED | OUTPATIENT
Start: 2018-11-08 | End: 2019-02-09

## 2018-11-08 NOTE — TELEPHONE ENCOUNTER
Prior Authorization Retail Medication Request    Medication/Dose: chantix  ICD code (if different than what is on RX):    Previously Tried and Failed:  Nicotine patches  Rationale:  Nicotine dependence    Insurance Name:  [facility]  Insurance ID:  [id number]      Pharmacy Information (if different than what is on RX)  Name:    Phone:

## 2018-11-08 NOTE — TELEPHONE ENCOUNTER
Prior Authorization Approval    Authorization Effective Date: 8/10/2018  Authorization Expiration Date: 5/7/2019  Medication: chantix  Approved Dose/Quantity:    Reference #:     Insurance Company: [employer] - Phone [phone] Fax [phone]  Expected CoPay:       CoPay Card Available:      Foundation Assistance Needed:    Which Pharmacy is filling the prescription (Not needed for infusion/clinic administered): [id number] - [facility], [place] - [address]   Pharmacy Notified: Yes  Patient Notified: Yes

## 2018-11-08 NOTE — TELEPHONE ENCOUNTER
Central Prior Authorization Team   Phone: [phone]    PA Initiation    Medication: chantix  Insurance Company: [employer] - Phone [phone] Fax [phone]  Pharmacy Filling the Rx: [id number] - [place], [place] - [address]   Filling Pharmacy Phone: [phone]  Filling Pharmacy Fax:    Start Date: 11/8/2018

## 2018-11-19 DIAGNOSIS — I10 ESSENTIAL HYPERTENSION: ICD-10-CM

## 2018-11-19 DIAGNOSIS — E78.5 HYPERLIPIDEMIA LDL GOAL <100: ICD-10-CM

## 2018-11-19 DIAGNOSIS — M85.80 OSTEOPENIA, UNSPECIFIED LOCATION: ICD-10-CM

## 2018-11-19 RX ORDER — ATORVASTATIN CALCIUM 40 MG/1
TABLET, FILM COATED ORAL
Qty: 90 TABLET | Refills: 0 | Status: SHIPPED | OUTPATIENT
Start: 2018-11-19 | End: 2019-02-17

## 2018-11-19 RX ORDER — LOSARTAN POTASSIUM 100 MG/1
TABLET ORAL
Qty: 90 TABLET | Refills: 0 | Status: SHIPPED | OUTPATIENT
Start: 2018-11-19 | End: 2019-02-17

## 2018-11-19 RX ORDER — ALENDRONATE SODIUM 35 MG/1
TABLET ORAL
Qty: 4 TABLET | Refills: 1 | Status: SHIPPED | OUTPATIENT
Start: 2018-11-19 | End: 2019-01-15

## 2018-11-20 ENCOUNTER — TELEPHONE (OUTPATIENT)
Dept: OTHER | Facility: CLINIC | Age: 73
End: 2018-11-20

## 2018-11-20 DIAGNOSIS — M85.80 OSTEOPENIA: ICD-10-CM

## 2018-11-20 DIAGNOSIS — Z79.52 LONG TERM (CURRENT) USE OF SYSTEMIC STEROIDS: Primary | ICD-10-CM

## 2018-11-20 NOTE — TELEPHONE ENCOUNTER
patient was seen by rheumatology at [facility] and provider would like a repeat done  Patient would like to do the DEXA locally as he has before  Exam loaded for PCP  Mica Keys, RN, BSN

## 2018-11-30 ENCOUNTER — HOSPITAL ENCOUNTER (OUTPATIENT)
Dept: BONE DENSITY | Facility: CLINIC | Age: 73
Discharge: HOME | End: 2018-11-30
Attending: INTERNAL MEDICINE | Admitting: INTERNAL MEDICINE
Payer: MEDICARE

## 2018-11-30 DIAGNOSIS — Z79.52 LONG TERM (CURRENT) USE OF SYSTEMIC STEROIDS: ICD-10-CM

## 2018-11-30 DIAGNOSIS — M85.80 OSTEOPENIA: ICD-10-CM

## 2018-11-30 PROCEDURE — 77080 DXA BONE DENSITY AXIAL: CPT

## 2018-12-19 ENCOUNTER — HOSPITAL ENCOUNTER (OUTPATIENT)
Dept: LAB | Facility: CLINIC | Age: 73
Discharge: HOME | End: 2018-12-19
Attending: INTERNAL MEDICINE | Admitting: INTERNAL MEDICINE
Payer: MEDICARE

## 2018-12-19 DIAGNOSIS — R73.01 IFG (IMPAIRED FASTING GLUCOSE): ICD-10-CM

## 2018-12-19 DIAGNOSIS — I71.21 ASCENDING AORTIC ANEURYSM (H): ICD-10-CM

## 2018-12-19 DIAGNOSIS — Z12.5 SCREENING FOR PROSTATE CANCER: ICD-10-CM

## 2018-12-19 DIAGNOSIS — I71.40 ABDOMINAL AORTIC ANEURYSM WITHOUT RUPTURE (H): ICD-10-CM

## 2018-12-19 DIAGNOSIS — K86.89 PANCREATIC MASS: ICD-10-CM

## 2018-12-19 DIAGNOSIS — Z72.0 TOBACCO ABUSE: ICD-10-CM

## 2018-12-19 DIAGNOSIS — R09.89 BRUIT OF RIGHT CAROTID ARTERY: ICD-10-CM

## 2018-12-19 DIAGNOSIS — I10 ESSENTIAL HYPERTENSION: ICD-10-CM

## 2018-12-19 DIAGNOSIS — E78.5 HYPERLIPIDEMIA LDL GOAL <100: ICD-10-CM

## 2018-12-19 DIAGNOSIS — I77.6 ARTERITIS, UNSPECIFIED (H): ICD-10-CM

## 2018-12-19 LAB
ALBUMIN SERPL-MCNC: 3.7 G/DL (ref 3.4–5)
ALP SERPL-CCNC: 102 U/L (ref 40–150)
ALT SERPL W P-5'-P-CCNC: 25 U/L (ref 0–70)
ANION GAP SERPL CALCULATED.3IONS-SCNC: 6 MMOL/L (ref 3–14)
AST SERPL W P-5'-P-CCNC: 23 U/L (ref 0–45)
BASOPHILS # BLD AUTO: 0 10E9/L (ref 0–0.2)
BASOPHILS NFR BLD AUTO: 0.3 %
BILIRUB DIRECT SERPL-MCNC: 0.2 MG/DL (ref 0–0.2)
BILIRUB SERPL-MCNC: 0.6 MG/DL (ref 0.2–1.3)
BUN SERPL-MCNC: 11 MG/DL (ref 7–30)
CALCIUM SERPL-MCNC: 9 MG/DL (ref 8.5–10.1)
CHLORIDE SERPL-SCNC: 102 MMOL/L (ref 94–109)
CHOLEST SERPL-MCNC: 106 MG/DL
CO2 SERPL-SCNC: 28 MMOL/L (ref 20–32)
CREAT SERPL-MCNC: 0.83 MG/DL (ref 0.66–1.25)
CRP SERPL-MCNC: <2.9 MG/L (ref 0–8)
DIFFERENTIAL METHOD BLD: ABNORMAL
EOSINOPHIL # BLD AUTO: 0.1 10E9/L (ref 0–0.7)
EOSINOPHIL NFR BLD AUTO: 1.4 %
ERYTHROCYTE [DISTWIDTH] IN BLOOD BY AUTOMATED COUNT: 12.8 % (ref 10–15)
ERYTHROCYTE [SEDIMENTATION RATE] IN BLOOD BY WESTERGREN METHOD: 10 MM/H (ref 0–20)
GFR SERPL CREATININE-BSD FRML MDRD: 86 ML/MIN/{1.73_M2}
GLUCOSE SERPL-MCNC: 89 MG/DL (ref 70–99)
HBA1C MFR BLD: 5.7 % (ref 0–5.6)
HCT VFR BLD AUTO: 40.9 % (ref 40–53)
HDLC SERPL-MCNC: 35 MG/DL
HGB BLD-MCNC: 13.8 G/DL (ref 13.3–17.7)
IMM GRANULOCYTES # BLD: 0 10E9/L (ref 0–0.4)
IMM GRANULOCYTES NFR BLD: 0.4 %
LDLC SERPL CALC-MCNC: 56 MG/DL
LYMPHOCYTES # BLD AUTO: 1.2 10E9/L (ref 0.8–5.3)
LYMPHOCYTES NFR BLD AUTO: 15.8 %
MCH RBC QN AUTO: 32.6 PG (ref 26.5–33)
MCHC RBC AUTO-ENTMCNC: 33.7 G/DL (ref 31.5–36.5)
MCV RBC AUTO: 97 FL (ref 78–100)
MONOCYTES # BLD AUTO: 0.6 10E9/L (ref 0–1.3)
MONOCYTES NFR BLD AUTO: 8.2 %
NEUTROPHILS # BLD AUTO: 5.8 10E9/L (ref 1.6–8.3)
NEUTROPHILS NFR BLD AUTO: 73.9 %
NONHDLC SERPL-MCNC: 71 MG/DL
NRBC # BLD AUTO: 0 10*3/UL
NRBC BLD AUTO-RTO: 0 /100
PLATELET # BLD AUTO: 249 10E9/L (ref 150–450)
POTASSIUM SERPL-SCNC: 4.5 MMOL/L (ref 3.4–5.3)
PROT SERPL-MCNC: 6.8 G/DL (ref 6.8–8.8)
PSA SERPL-ACNC: 0.54 UG/L (ref 0–4)
RBC # BLD AUTO: 4.23 10E12/L (ref 4.4–5.9)
SODIUM SERPL-SCNC: 136 MMOL/L (ref 133–144)
T3FREE SERPL-MCNC: 2.7 PG/ML (ref 2.3–4.2)
T4 FREE SERPL-MCNC: 0.96 NG/DL (ref 0.76–1.46)
TRIGL SERPL-MCNC: 75 MG/DL
TSH SERPL DL<=0.005 MIU/L-ACNC: 1.9 MU/L (ref 0.4–4)
WBC # BLD AUTO: 7.8 10E9/L (ref 4–11)

## 2018-12-19 PROCEDURE — 84481 FREE ASSAY (FT-3): CPT | Performed by: INTERNAL MEDICINE

## 2018-12-19 PROCEDURE — 84443 ASSAY THYROID STIM HORMONE: CPT | Performed by: INTERNAL MEDICINE

## 2018-12-19 PROCEDURE — 83036 HEMOGLOBIN GLYCOSYLATED A1C: CPT | Performed by: INTERNAL MEDICINE

## 2018-12-19 PROCEDURE — 85025 COMPLETE CBC W/AUTO DIFF WBC: CPT | Performed by: INTERNAL MEDICINE

## 2018-12-19 PROCEDURE — 36415 COLL VENOUS BLD VENIPUNCTURE: CPT | Performed by: INTERNAL MEDICINE

## 2018-12-19 PROCEDURE — 85652 RBC SED RATE AUTOMATED: CPT | Performed by: INTERNAL MEDICINE

## 2018-12-19 PROCEDURE — G0103 PSA SCREENING: HCPCS | Performed by: INTERNAL MEDICINE

## 2018-12-19 PROCEDURE — 80076 HEPATIC FUNCTION PANEL: CPT | Performed by: INTERNAL MEDICINE

## 2018-12-19 PROCEDURE — 84439 ASSAY OF FREE THYROXINE: CPT | Performed by: INTERNAL MEDICINE

## 2018-12-19 PROCEDURE — 80048 BASIC METABOLIC PNL TOTAL CA: CPT | Performed by: INTERNAL MEDICINE

## 2018-12-19 PROCEDURE — 80061 LIPID PANEL: CPT | Performed by: INTERNAL MEDICINE

## 2018-12-19 PROCEDURE — 86140 C-REACTIVE PROTEIN: CPT | Performed by: INTERNAL MEDICINE

## 2019-01-11 DIAGNOSIS — M85.80 OSTEOPENIA, UNSPECIFIED LOCATION: ICD-10-CM

## 2019-01-15 DIAGNOSIS — M85.80 OSTEOPENIA, UNSPECIFIED LOCATION: ICD-10-CM

## 2019-01-15 RX ORDER — ALENDRONATE SODIUM 35 MG/1
TABLET ORAL
Qty: 4 TABLET | Refills: 0 | Status: SHIPPED | OUTPATIENT
Start: 2019-01-15 | End: 2019-02-18

## 2019-02-09 DIAGNOSIS — Z72.0 TOBACCO ABUSE: ICD-10-CM

## 2019-02-11 RX ORDER — VARENICLINE TARTRATE 1 MG/1
TABLET, FILM COATED ORAL
Qty: 180 TABLET | Refills: 0 | Status: SHIPPED | OUTPATIENT
Start: 2019-02-11 | End: 2019-03-18

## 2019-02-17 DIAGNOSIS — I10 ESSENTIAL HYPERTENSION: ICD-10-CM

## 2019-02-17 DIAGNOSIS — E78.5 HYPERLIPIDEMIA LDL GOAL <100: ICD-10-CM

## 2019-02-18 DIAGNOSIS — M85.80 OSTEOPENIA, UNSPECIFIED LOCATION: ICD-10-CM

## 2019-02-18 RX ORDER — ALENDRONATE SODIUM 35 MG/1
TABLET ORAL
Qty: 4 TABLET | Refills: 0 | Status: SHIPPED | OUTPATIENT
Start: 2019-02-18 | End: 2019-03-18

## 2019-02-18 RX ORDER — LOSARTAN POTASSIUM 100 MG/1
TABLET ORAL
Qty: 90 TABLET | Refills: 0 | Status: SHIPPED | OUTPATIENT
Start: 2019-02-18 | End: 2019-04-19

## 2019-02-18 RX ORDER — ATORVASTATIN CALCIUM 40 MG/1
TABLET, FILM COATED ORAL
Qty: 90 TABLET | Refills: 0 | Status: SHIPPED | OUTPATIENT
Start: 2019-02-18 | End: 2019-04-19

## 2019-03-18 ENCOUNTER — TELEPHONE (OUTPATIENT)
Dept: OTHER | Facility: CLINIC | Age: 74
End: 2019-03-18

## 2019-03-18 DIAGNOSIS — I10 ESSENTIAL HYPERTENSION: ICD-10-CM

## 2019-03-18 DIAGNOSIS — M85.80 OSTEOPENIA, UNSPECIFIED LOCATION: ICD-10-CM

## 2019-03-18 DIAGNOSIS — E78.5 HYPERLIPIDEMIA LDL GOAL <100: Primary | ICD-10-CM

## 2019-03-18 DIAGNOSIS — R73.01 IMPAIRED FASTING GLUCOSE: ICD-10-CM

## 2019-03-18 DIAGNOSIS — Z72.0 TOBACCO ABUSE: ICD-10-CM

## 2019-03-18 NOTE — TELEPHONE ENCOUNTER
L/m 3/18/19 to call and schedule labs/Dr Henry visit. Ameena Espinoza -  at [facility]

## 2019-03-18 NOTE — TELEPHONE ENCOUNTER
Patient LM stating that he needs a refill of chantix and fosamax.  Noted that patient needs to schedule labs and office visit.  Routed to scheduling to coordinate appointment for both.  meds sent to covering provider for approval  Mica Keys RN, BSN

## 2019-03-20 ENCOUNTER — TELEPHONE (OUTPATIENT)
Dept: OTHER | Facility: CLINIC | Age: 74
End: 2019-03-20

## 2019-03-20 RX ORDER — VARENICLINE TARTRATE 1 MG/1
1 TABLET, FILM COATED ORAL 2 TIMES DAILY
Qty: 60 TABLET | Refills: 0 | Status: SHIPPED | OUTPATIENT
Start: 2019-03-20 | End: 2019-04-19

## 2019-03-20 RX ORDER — ALENDRONATE SODIUM 35 MG/1
TABLET ORAL
Qty: 4 TABLET | Refills: 0 | Status: SHIPPED | OUTPATIENT
Start: 2019-03-20 | End: 2019-04-20

## 2019-03-20 NOTE — TELEPHONE ENCOUNTER
Contacted pharmacy for current insurance information to start prior auth  Waiting to hear back  Mica Keys, RN, BSN

## 2019-03-20 NOTE — TELEPHONE ENCOUNTER
Prior Authorization Retail Medication Request    Medication/Dose: chantix  ICD code (if different than what is on RX):    Previously Tried and Failed:  Patches   Rationale:  Tobacco abuse    Insurance Name:  [employer]na medicare Part D   Insurance ID:  [id number]      Pharmacy Information (if different than what is on RX)  Name:    Phone:

## 2019-03-22 NOTE — TELEPHONE ENCOUNTER
Pt is scheduled for labs 4/5 at [facility] on 4/19/19.  He is still waiting for his Chantix RX, I let him know it looks like it's in the prior authorization by ins. Kathi. Routing to Dr Henry's RN for FYI. Ameena Espinoza -  at [facility]

## 2019-03-26 NOTE — TELEPHONE ENCOUNTER
Central Prior Authorization Team   Phone: [phone]    PA Initiation    Medication: chantix  Insurance Company: [employer] - Phone [phone] Fax [phone]  Pharmacy Filling the Rx: [facility] #[id number] - [place] - [address]   Filling Pharmacy Phone: [phone]  Filling Pharmacy Fax: [phone]  Start Date: 3/26/2019

## 2019-04-05 DIAGNOSIS — I10 ESSENTIAL HYPERTENSION: ICD-10-CM

## 2019-04-05 DIAGNOSIS — R73.01 IMPAIRED FASTING GLUCOSE: ICD-10-CM

## 2019-04-05 DIAGNOSIS — E78.5 HYPERLIPIDEMIA LDL GOAL <100: ICD-10-CM

## 2019-04-05 LAB
ALBUMIN SERPL-MCNC: 3.9 G/DL (ref 3.4–5)
ALP SERPL-CCNC: 101 U/L (ref 40–150)
ALT SERPL W P-5'-P-CCNC: 26 U/L (ref 0–70)
ANION GAP SERPL CALCULATED.3IONS-SCNC: 6 MMOL/L (ref 3–14)
AST SERPL W P-5'-P-CCNC: 29 U/L (ref 0–45)
BILIRUB DIRECT SERPL-MCNC: 0.2 MG/DL (ref 0–0.2)
BILIRUB SERPL-MCNC: 0.6 MG/DL (ref 0.2–1.3)
BUN SERPL-MCNC: 15 MG/DL (ref 7–30)
CALCIUM SERPL-MCNC: 9.5 MG/DL (ref 8.5–10.1)
CHLORIDE SERPL-SCNC: 108 MMOL/L (ref 94–109)
CHOLEST SERPL-MCNC: 117 MG/DL
CO2 SERPL-SCNC: 27 MMOL/L (ref 20–32)
CREAT SERPL-MCNC: 0.93 MG/DL (ref 0.66–1.25)
GFR SERPL CREATININE-BSD FRML MDRD: 80 ML/MIN/{1.73_M2}
GLUCOSE SERPL-MCNC: 95 MG/DL (ref 70–99)
HBA1C MFR BLD: 5.8 % (ref 0–5.6)
HDLC SERPL-MCNC: 36 MG/DL
LDLC SERPL CALC-MCNC: 69 MG/DL
NONHDLC SERPL-MCNC: 81 MG/DL
POTASSIUM SERPL-SCNC: 4.3 MMOL/L (ref 3.4–5.3)
PROT SERPL-MCNC: 7.3 G/DL (ref 6.8–8.8)
SODIUM SERPL-SCNC: 141 MMOL/L (ref 133–144)
T3FREE SERPL-MCNC: 2.4 PG/ML (ref 2.3–4.2)
T4 FREE SERPL-MCNC: 0.94 NG/DL (ref 0.76–1.46)
TRIGL SERPL-MCNC: 58 MG/DL
TSH SERPL DL<=0.005 MIU/L-ACNC: 1.67 MU/L (ref 0.4–4)

## 2019-04-05 PROCEDURE — 83036 HEMOGLOBIN GLYCOSYLATED A1C: CPT | Performed by: INTERNAL MEDICINE

## 2019-04-05 PROCEDURE — 80076 HEPATIC FUNCTION PANEL: CPT | Performed by: INTERNAL MEDICINE

## 2019-04-05 PROCEDURE — 84481 FREE ASSAY (FT-3): CPT | Performed by: INTERNAL MEDICINE

## 2019-04-05 PROCEDURE — 84439 ASSAY OF FREE THYROXINE: CPT | Performed by: INTERNAL MEDICINE

## 2019-04-05 PROCEDURE — 84443 ASSAY THYROID STIM HORMONE: CPT | Performed by: INTERNAL MEDICINE

## 2019-04-05 PROCEDURE — 80048 BASIC METABOLIC PNL TOTAL CA: CPT | Performed by: INTERNAL MEDICINE

## 2019-04-05 PROCEDURE — 36415 COLL VENOUS BLD VENIPUNCTURE: CPT | Performed by: INTERNAL MEDICINE

## 2019-04-05 PROCEDURE — 80061 LIPID PANEL: CPT | Performed by: INTERNAL MEDICINE

## 2019-04-06 NOTE — TELEPHONE ENCOUNTER
Prior Authorization Approval    Authorization Effective Date: 12/30/2018  Authorization Expiration Date: 10/5/2019  Medication: chantix-APPROVED  Approved Dose/Quantity:    Reference #:     Insurance Company: [employer] - Phone [phone] Fax [phone]  Expected CoPay:       CoPay Card Available:      Foundation Assistance Needed:    Which Pharmacy is filling the prescription (Not needed for infusion/clinic administered): [id number] - [place], [place] - [address]   Pharmacy Notified: Yes  Patient Notified: Yes

## 2019-04-11 DIAGNOSIS — M85.80 OSTEOPENIA, UNSPECIFIED LOCATION: ICD-10-CM

## 2019-04-11 NOTE — TELEPHONE ENCOUNTER
"Last Written Prescription Date:  1/11/19  Last Fill Quantity: 180,  # refills: 0   Last office visit: No previous visit found with prescribing provider:  10/12/17   Future Office Visit:   Next 5 appointments (look out 90 days)    Apr 19, 2019  9:40 AM CDT  (Arrive by 9:25 AM)  Return Visit with Christopher Henry MD  [facility] ([facility]) [address]  [place] [id number] [phone]         Requested Prescriptions   Pending Prescriptions Disp Refills     calcium carbonate 600 mg-vitamin D 400 units (CALTRATE) 600-400 MG-UNIT per tablet [Pharmacy Med Name: Calcium Carbonate-Vitamin D Oral Tablet 600-400 MG-UNIT] 180 tablet 0     Sig: TAKE 1 TABLET BY MOUTH TWICE DAILY       Vitamin Supplements (Adult) Protocol Passed - 4/11/2019  7:02 AM        Passed - High dose Vitamin D not ordered        Passed - Recent (12 mo) or future (30 days) visit within the authorizing provider's specialty     Patient had office visit in the last 12 months or has a visit in the next 30 days with authorizing provider or within the authorizing provider's specialty.  See \"Patient Info\" tab in inbasket, or \"Choose Columns\" in Meds & Orders section of the refill encounter.              Passed - Medication is active on med list        Prescription approved per [facility] Refill Protocol.  Mica Keys, YG, BSN    "

## 2019-04-19 ENCOUNTER — OFFICE VISIT (OUTPATIENT)
Dept: OTHER | Facility: CLINIC | Age: 74
End: 2019-04-19
Attending: INTERNAL MEDICINE
Payer: MEDICARE

## 2019-04-19 VITALS
DIASTOLIC BLOOD PRESSURE: 62 MMHG | RESPIRATION RATE: 16 BRPM | HEIGHT: 74 IN | BODY MASS INDEX: 25.03 KG/M2 | HEART RATE: 63 BPM | WEIGHT: 195 LBS | SYSTOLIC BLOOD PRESSURE: 124 MMHG | OXYGEN SATURATION: 99 %

## 2019-04-19 DIAGNOSIS — I35.0 NONRHEUMATIC AORTIC VALVE STENOSIS: ICD-10-CM

## 2019-04-19 DIAGNOSIS — R09.89 BRUIT OF RIGHT CAROTID ARTERY: ICD-10-CM

## 2019-04-19 DIAGNOSIS — K86.89 PANCREATIC MASS: ICD-10-CM

## 2019-04-19 DIAGNOSIS — Z00.00 MEDICARE ANNUAL WELLNESS VISIT, SUBSEQUENT: Primary | ICD-10-CM

## 2019-04-19 DIAGNOSIS — I71.40 ABDOMINAL AORTIC ANEURYSM WITHOUT RUPTURE (H): ICD-10-CM

## 2019-04-19 DIAGNOSIS — E78.5 HYPERLIPIDEMIA LDL GOAL <100: ICD-10-CM

## 2019-04-19 DIAGNOSIS — Z12.5 SCREENING FOR PROSTATE CANCER: ICD-10-CM

## 2019-04-19 DIAGNOSIS — I71.21 ANEURYSM OF ASCENDING AORTA (H): ICD-10-CM

## 2019-04-19 DIAGNOSIS — I71.21 ASCENDING AORTIC ANEURYSM (H): ICD-10-CM

## 2019-04-19 DIAGNOSIS — Z72.0 TOBACCO ABUSE: ICD-10-CM

## 2019-04-19 DIAGNOSIS — R73.01 IMPAIRED FASTING GLUCOSE: ICD-10-CM

## 2019-04-19 DIAGNOSIS — I71.40 ABDOMINAL AORTIC ANEURYSM (AAA) WITHOUT RUPTURE (H): ICD-10-CM

## 2019-04-19 DIAGNOSIS — M85.80 OSTEOPENIA, UNSPECIFIED LOCATION: ICD-10-CM

## 2019-04-19 DIAGNOSIS — I10 ESSENTIAL HYPERTENSION: ICD-10-CM

## 2019-04-19 PROCEDURE — G0463 HOSPITAL OUTPT CLINIC VISIT: HCPCS

## 2019-04-19 PROCEDURE — 99213 OFFICE O/P EST LOW 20 MIN: CPT | Mod: 25 | Performed by: INTERNAL MEDICINE

## 2019-04-19 PROCEDURE — G0439 PPPS, SUBSEQ VISIT: HCPCS | Mod: ZP | Performed by: INTERNAL MEDICINE

## 2019-04-19 RX ORDER — ATORVASTATIN CALCIUM 40 MG/1
TABLET, FILM COATED ORAL
Qty: 90 TABLET | Refills: 0 | Status: SHIPPED | OUTPATIENT
Start: 2019-04-19 | End: 2019-09-01

## 2019-04-19 RX ORDER — METOPROLOL TARTRATE 25 MG/1
TABLET, FILM COATED ORAL
Qty: 90 TABLET | Refills: 2 | Status: SHIPPED | OUTPATIENT
Start: 2019-04-19 | End: 2020-02-28

## 2019-04-19 RX ORDER — LOSARTAN POTASSIUM 100 MG/1
TABLET ORAL
Qty: 90 TABLET | Refills: 0 | Status: SHIPPED | OUTPATIENT
Start: 2019-04-19 | End: 2019-09-01

## 2019-04-19 ASSESSMENT — MIFFLIN-ST. JEOR: SCORE: 1694.26

## 2019-04-19 NOTE — PROGRESS NOTES
"Kaleb Pennington is a 73 year old male who presents for:  Chief Complaint   Patient presents with     RECHECK     18 mo f/u to 10/2017 visit with Dr Henry. Labs at  on 4/5/19 *        Vitals:    Vitals:    04/19/19 0938 04/19/19 0940   BP: 159/62 146/69   BP Location: Right arm Left arm   Patient Position: Chair Chair   Cuff Size: Adult Regular Adult Regular   Pulse: 63    Resp: 16    SpO2: 99%    Weight: 195 lb (88.5 kg)    Height: 6' 2\" (1.88 m)        BMI:  Estimated body mass index is 25.04 kg/m  as calculated from the following:    Height as of this encounter: 6' 2\" (1.88 m).    Weight as of this encounter: 195 lb (88.5 kg).    Pain Score:  Data Unavailable        Riki Gonzalez MA    "

## 2019-04-19 NOTE — PROGRESS NOTES
Kaleb Pennington is a 73 year old male who presents for        Medicare annual wellness visit, subsequent  Hyperlipidemia LDL goal <100  Impaired fasting glucose  Essential hypertension  Ascending aortic aneurysm (H)  Abdominal aortic aneurysm without rupture (H)  Bruit of right carotid artery  Tobacco abuse  Osteopenia, unspecified location  Pancreatic mass  Aneurysm of ascending aorta (H)  Abdominal aortic aneurysm (AAA) without rupture (H)  Screening for prostate cancer     Review Of Systems  Skin: negative  Eyes: negative  Ears/Nose/Throat: negative  Respiratory: No shortness of breath, dyspnea on exertion, cough, or hemoptysis  Cardiovascular: negative  Gastrointestinal: negative  Genitourinary: negative  Musculoskeletal: negative  Neurologic: negative  Psychiatric: negative  Hematologic/Lymphatic/Immunologic: negative  Endocrine: negative     Current providers caring for this patient include:  Patient Care Team:  Christopher Henry MD as PCP - General (Internal Medicine)    Complete Medical and Social history reviewed with patient, outlined below.    Patient Active Problem List   Diagnosis     Venous ulcer of ankle, right (H)       Past Medical History:   Diagnosis Date     Hypertension        Past Surgical History:   Procedure Laterality Date     ORTHOPEDIC SURGERY         Family History   Problem Relation Age of Onset     Cancer Mother      Cancer Maternal Grandmother      Cancer Sister      Cancer Daughter        Social History     Tobacco Use     Smoking status: Light Tobacco Smoker     Packs/day: 0.25     Years: 50.00     Pack years: 12.50     Types: Cigarettes     Smokeless tobacco: Never Used   Substance Use Topics     Alcohol use: No     Alcohol/week: 0.0 oz       Diet: regular, low salt/low fat  Physical Activity: generally inactive  Depression Screen:    Over the past 2 weeks, patient has felt down, depressed, or hopeless:  no    Over the past 2 weeks, patient has felt little interest or  "pleasure in doing things: No    Functional ability/Safety screen:  Up and go test (able to get up and walk longer than 30 seconds): Passed  Patient needs assistance with: nothing  Patient's home has the following possible safety concerns: none identified  Patient has concerns about his hearing:  Yes  Cognitive Screen  Patient repeats three objects (ball, flag, tree)      Clock drawing test:   NORMAL  Recalls three objects after 3 minutes (ball,flag,tree):                                                                                               recalls 3 objects (3 points)    Physical Exam:  /69 (BP Location: Left arm, Patient Position: Chair, Cuff Size: Adult Regular)   Pulse 63   Resp 16   Ht 6' 2\" (1.88 m)   Wt 195 lb (88.5 kg)   SpO2 99%   BMI 25.04 kg/m     Body mass index is 25.04 kg/m .           GENERAL APPEARANCE: healthy, alert and no distress  PSYCH: Affect normal/bright.  Mentation within normal limits.  EYES: conjunctiva clear  HENT: ear canals and TM's normal.  Nose and mouth without ulcers, erythema or lesions  NECK: supple, nontender, no lymphadenopathy  RESP: lungs clear to auscultation - no rales, rhonchi or wheezes  CV: regular rates and rhythm, normal S1 S2, no murmur noted  ABDOMEN:  soft, nontender, no HSM or masses and bowel sounds normal  SKIN: no suspicious lesions or rashes  NEURO: Normal strength and tone,  normal speech and mentation  Extremities: no peripheral edema or tenderness, peripheral pulses normal  MS: extremities normal- no gross deformities noted, no erythema, FROM noted in all extremities  PSYCH: mentation appears normal  LYMPHATICS: no cervical adenopathy    End of Life Planning:   Patient currently has an advanced directive: Yes.  Practitioner is supportive of decision.    Education/Counseling:   Based on review of the above information, the following items were addressed:      Elevated blood pressure - follow-up plans made    Appropriate preventive services " were discussed with this patient, including applicable screening as appropriate for cardiovascular disease, diabetes, osteopenia/osteoporosis, and glaucoma.  As appropriate for age/gender, discussed screening for colorectal cancer, prostate cancer, breast cancer, and cervical cancer.   Checklist reviewing preventive services available has been given to the patient.              Component      Latest Ref Rng & Units 4/5/2019   Sodium      133 - 144 mmol/L 141   Potassium      3.4 - 5.3 mmol/L 4.3   Chloride      94 - 109 mmol/L 108   Carbon Dioxide      20 - 32 mmol/L 27   Anion Gap      3 - 14 mmol/L 6   Glucose      70 - 99 mg/dL 95   Urea Nitrogen      7 - 30 mg/dL 15   Creatinine      0.66 - 1.25 mg/dL 0.93   GFR Estimate      >60 mL/min/1.73:m2 80   GFR Estimate If Black      >60 mL/min/1.73:m2 >90   Calcium      8.5 - 10.1 mg/dL 9.5   Bilirubin Direct      0.0 - 0.2 mg/dL 0.2   Bilirubin Total      0.2 - 1.3 mg/dL 0.6   Albumin      3.4 - 5.0 g/dL 3.9   Protein Total      6.8 - 8.8 g/dL 7.3   Alkaline Phosphatase      40 - 150 U/L 101   ALT      0 - 70 U/L 26   AST      0 - 45 U/L 29   Cholesterol      <200 mg/dL 117   Triglycerides      <150 mg/dL 58   HDL Cholesterol      >39 mg/dL 36 (L)   LDL Cholesterol Calculated      <100 mg/dL 69   Non HDL Cholesterol      <130 mg/dL 81   Hemoglobin A1C      0 - 5.6 % 5.8 (H)   T4 Free      0.76 - 1.46 ng/dL 0.94   Free T3      2.3 - 4.2 pg/mL 2.4   TSH      0.40 - 4.00 mU/L 1.67       (Z00.00) Medicare annual wellness visit, subsequent  (primary encounter diagnosis)  Comment: RTC one year for annual physical  Plan: Follow-Up with Vascular Medicine, T3 Free, T4         free, TSH, Basic metabolic panel, Hepatic         panel, Lipid Profile, Hemoglobin A1c, Albumin         Random Urine Quantitative with Creat Ratio,         Prostate spec antigen screen            (E78.5) Hyperlipidemia LDL goal <100  Comment: at goal  Plan: OFFICE/OUTPT VISIT,EST,LEVL III, Follow-Up  with        Vascular Medicine, T3 Free, T4 free, TSH, Lipid        Profile           (R73.01) Impaired fasting glucose  Comment: avoid CHO  Plan: OFFICE/OUTPT VISIT,EST,LEVL III, Follow-Up with        Vascular Medicine, Hemoglobin A1c, Albumin         Random Urine Quantitative with Creat Ratio            (I10) Essential hypertension  Comment: slightly higher than optimal today (bad traffic)  Plan: OFFICE/OUTPT VISIT,EST,LEVL III, Follow-Up with        Vascular Medicine           (I71.2) 37 mm Ascending aortic aneurysm (H)  Comment: this is being monitored by Vascular Medicine at [place]   Plan: OFFICE/OUTPT VISIT,EST,LEVL III, Follow-Up with        Vascular Medicine            (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)  Comment:  this is being monitored by Vascular Medicine at [place]   Plan: OFFICE/OUTPT VISIT,EST,LEVL III, Follow-Up with        Vascular Medicine            (R09.89) Bruit of right carotid artery  Comment: 10-15 % stenosis on last carotid duplex  Plan: OFFICE/OUTPT VISIT,EST,LEVL III, Follow-Up with        Vascular Medicine            (Z72.0) Tobacco abuse  Comment: he has stopped  Plan: OFFICE/OUTPT VISIT,EST,LEVL III, Follow-Up with        Vascular Medicine            (M85.80) Osteopenia, unspecified location  Comment: this is being managed by rheumatology at [facility]  Plan: OFFICE/OUTPT VISIT,EST,LEVL III, Follow-Up with        Vascular Medicine           (K86.9) Pancreatic mass  Comment: he is seeing Dr. Swanson at [facility] regarding this  Plan: OFFICE/OUTPT VISIT,EST,LEVL III, Follow-Up with        Vascular Medicine           (I71.2) Aneurysm of ascending aorta (H)  Comment:  this is being monitored by Vascular Medicine at [place]  Plan: OFFICE/OUTPT VISIT,EST,LEVL III, Follow-Up with        Vascular Medicine, CANCELED: CTA Chest Abdomen         Pelvis w Contrast            (I71.4) Abdominal aortic aneurysm (AAA) without rupture (H)  Comment:  this is being monitored by Vascular  Medicine at [place]   Plan: OFFICE/OUTPT VISIT,PHUONG WARE III, Follow-Up with        Vascular Medicine, CANCELED: CTA Chest Abdomen         Pelvis w Contrast            (Z12.5) Screening for prostate cancer  Comment: check next year  Plan: OFFICE/OUTPT VISIT,PHUONG WARE III, Follow-Up with        Vascular Medicine, Prostate spec antigen screen           (I35.0) Nonrheumatic aortic valve stenosis  Comment:  this is being monitored by Vascular Medicine at [place]   Plan: OFFICE/OUTPT VISIT,PHUONG WARE III, CANCELED:         Echocardiogram Complete              Greater than one half the 15 minutes not spent on preventive care during this visit was spent providing aducation and counselling regarding item(s) # 2 onward as delineated above.

## 2019-04-20 DIAGNOSIS — M85.80 OSTEOPENIA, UNSPECIFIED LOCATION: ICD-10-CM

## 2019-04-22 RX ORDER — ALENDRONATE SODIUM 35 MG/1
TABLET ORAL
Qty: 13 TABLET | Refills: 3 | Status: SHIPPED | OUTPATIENT
Start: 2019-04-22 | End: 2021-12-24

## 2019-04-22 NOTE — TELEPHONE ENCOUNTER
"Last Written Prescription Date:  3/20/19  Last Fill Quantity: 4,  # refills: 0   Last office visit: No previous visit found with prescribing provider:  4/19/19   Future Office Visit:    Requested Prescriptions   Pending Prescriptions Disp Refills     alendronate (FOSAMAX) 35 MG tablet [Pharmacy Med Name: Alendronate Sodium Oral Tablet 35 MG] 4 tablet 0     Sig: Take 1 tablet (35 mg) by mouth every 7 days **NEEDS TO BE SEEN FOR MORE REFILLS**       Bisphosphonates Passed - 4/20/2019  7:02 AM        Passed - Recent (12 mo) or future (30 days) visit within the authorizing provider's specialty     Patient had office visit in the last 12 months or has a visit in the next 30 days with authorizing provider or within the authorizing provider's specialty.  See \"Patient Info\" tab in inbasket, or \"Choose Columns\" in Meds & Orders section of the refill encounter.              Passed - Dexa on file within past 2 years     Please review last Dexa result.           Passed - Medication is active on med list        Passed - Patient is age 18 or older        Passed - Normal serum creatinine on file within past 12 months     Recent Labs   Lab Test 04/05/19  1030  03/03/17  1330   CR 0.93   < >  --    CREAT  --   --  1.0    < > = values in this interval not displayed.             Prescription approved per [facility] Refill Protocol.  Mica Keys, RN, BSN    "

## 2019-06-03 DIAGNOSIS — Z72.0 TOBACCO ABUSE: ICD-10-CM

## 2019-06-04 RX ORDER — VARENICLINE TARTRATE 1 MG/1
TABLET, FILM COATED ORAL
Qty: 60 TABLET | Refills: 0 | Status: SHIPPED | OUTPATIENT
Start: 2019-06-04 | End: 2019-07-16

## 2019-06-18 NOTE — TELEPHONE ENCOUNTER
Unable to refill per [facility] protocol  Med and pharm loaded  Mica Keys, RN, BSN    
Alert and oriented to person, place and time/Patient baseline mental status

## 2019-07-09 DIAGNOSIS — M85.80 OSTEOPENIA, UNSPECIFIED LOCATION: ICD-10-CM

## 2019-07-09 NOTE — TELEPHONE ENCOUNTER
"Last Written Prescription Date:  4/11/19  Last Fill Quantity: 180,  # refills: 0   Last office visit: 4/19/19        Requested Prescriptions   Pending Prescriptions Disp Refills     calcium carbonate 600 mg-vitamin D 400 units (CALTRATE) 600-400 MG-UNIT per tablet [Pharmacy Med Name: Calcium Carbonate-Vitamin D Oral Tablet 600-400 MG-UNIT] 180 tablet 0     Sig: TAKE 1 TABLET BY MOUTH TWICE DAILY       Vitamin Supplements (Adult) Protocol Passed - 7/9/2019  7:33 AM        Passed - High dose Vitamin D not ordered        Passed - Recent (12 mo) or future (30 days) visit within the authorizing provider's specialty     Patient had office visit in the last 12 months or has a visit in the next 30 days with authorizing provider or within the authorizing provider's specialty.  See \"Patient Info\" tab in inbasket, or \"Choose Columns\" in Meds & Orders section of the refill encounter.              Passed - Medication is active on med list          Prescription approved per [facility] Refill Protocol.    "

## 2019-07-16 DIAGNOSIS — Z72.0 TOBACCO ABUSE: ICD-10-CM

## 2019-07-16 RX ORDER — VARENICLINE TARTRATE 1 MG/1
TABLET, FILM COATED ORAL
Qty: 60 TABLET | Refills: 0 | Status: SHIPPED | OUTPATIENT
Start: 2019-07-16 | End: 2019-09-07

## 2019-07-16 NOTE — TELEPHONE ENCOUNTER
"Last Written Prescription Date:  6/4/19  Last Fill Quantity: 60,  # refills:    Last office visit: 4/19/19    Future Office Visit:    Requested Prescriptions   Pending Prescriptions Disp Refills     CHANTIX 1 MG tablet [Pharmacy Med Name: Chantix Oral Tablet 1 MG] 60 tablet 0     Sig: TAKE 1 TABLET BY MOUTH TWICE DAILY       Partial Cholinergic Nicotinic Agonist Agents Passed - 7/16/2019  9:38 AM        Passed - Blood pressure under 140/90 in past 12 months     BP Readings from Last 3 Encounters:   04/19/19 124/62   08/29/18 157/69   08/15/18 141/65                 Passed - Recent (12 mo) or future (30 days) visit within the authorizing provider's specialty     Patient had office visit in the last 12 months or has a visit in the next 30 days with authorizing provider or within the authorizing provider's specialty.  See \"Patient Info\" tab in inbasket, or \"Choose Columns\" in Meds & Orders section of the refill encounter.              Passed - Medication is active on med list        Passed - Patient is 18 years of age or older        Prescription approved per [facility] Refill Protocol.    "

## 2019-08-01 ENCOUNTER — TELEPHONE (OUTPATIENT)
Dept: OTHER | Facility: CLINIC | Age: 74
End: 2019-08-01

## 2019-08-01 NOTE — TELEPHONE ENCOUNTER
Patient left VM asking if there is anything special such as fasting that needs to be done for a pre-op physical.  He will be having cataract surgery.    Left VM relating to Ed that he does not need any labs or need to fast and requested that he call to schedule the appointment at his convenience.    This can be scheduled with Deedee Cross PA-C if necessary.    Rosibel Corado RN BSN

## 2019-09-01 DIAGNOSIS — I10 ESSENTIAL HYPERTENSION: ICD-10-CM

## 2019-09-01 DIAGNOSIS — E78.5 HYPERLIPIDEMIA LDL GOAL <100: ICD-10-CM

## 2019-09-03 RX ORDER — LOSARTAN POTASSIUM 100 MG/1
TABLET ORAL
Qty: 90 TABLET | Refills: 0 | Status: SHIPPED | OUTPATIENT
Start: 2019-09-03 | End: 2019-12-02

## 2019-09-03 RX ORDER — ATORVASTATIN CALCIUM 40 MG/1
TABLET, FILM COATED ORAL
Qty: 90 TABLET | Refills: 0 | Status: SHIPPED | OUTPATIENT
Start: 2019-09-03 | End: 2019-12-02

## 2019-09-03 NOTE — TELEPHONE ENCOUNTER
"Last Written Prescription Date:  4/19/19  Last Fill Quantity: 90,  # refills: 0   Last office visit: No previous visit found with prescribing provider:  4/19/19   Future Office Visit:    Requested Prescriptions   Pending Prescriptions Disp Refills     losartan (COZAAR) 100 MG tablet [Pharmacy Med Name: Losartan Potassium Oral Tablet 100 MG] 90 tablet 0     Sig: TAKE 1 TABLET BY MOUTH ONE TIME DAILY       Angiotensin-II Receptors Passed - 9/3/2019  8:11 AM        Passed - Last blood pressure under 140/90 in past 12 months     BP Readings from Last 3 Encounters:   04/19/19 124/62   08/29/18 157/69   08/15/18 141/65                 Passed - Recent (12 mo) or future (30 days) visit within the authorizing provider's specialty     Patient had office visit in the last 12 months or has a visit in the next 30 days with authorizing provider or within the authorizing provider's specialty.  See \"Patient Info\" tab in inbasket, or \"Choose Columns\" in Meds & Orders section of the refill encounter.              Passed - Medication is active on med list        Passed - Patient is age 18 or older        Passed - Normal serum creatinine on file in past 12 months     Recent Labs   Lab Test 04/05/19  1030  03/03/17  1330   CR 0.93   < >  --    CREAT  --   --  1.0    < > = values in this interval not displayed.             Passed - Normal serum potassium on file in past 12 months     Recent Labs   Lab Test 04/05/19  1030   POTASSIUM 4.3                    atorvastatin (LIPITOR) 40 MG tablet [Pharmacy Med Name: Atorvastatin Calcium Oral Tablet 40 MG] 90 tablet 0     Sig: TAKE 1 TABLET BY MOUTH ONE TIME DAILY       Statins Protocol Passed - 9/3/2019  8:11 AM        Passed - LDL on file in past 12 months     Recent Labs   Lab Test 04/05/19  1030   LDL 69             Passed - No abnormal creatine kinase in past 12 months     No lab results found.             Passed - Recent (12 mo) or future (30 days) visit within the authorizing provider's " "specialty     Patient had office visit in the last 12 months or has a visit in the next 30 days with authorizing provider or within the authorizing provider's specialty.  See \"Patient Info\" tab in inbasket, or \"Choose Columns\" in Meds & Orders section of the refill encounter.              Passed - Medication is active on med list        Passed - Patient is age 18 or older        Prescription approved per [facility] Refill Protocol.    Kacie ADAMN, RN    "

## 2019-09-07 DIAGNOSIS — Z72.0 TOBACCO ABUSE: ICD-10-CM

## 2019-09-09 RX ORDER — VARENICLINE TARTRATE 1 MG/1
TABLET, FILM COATED ORAL
Qty: 60 TABLET | Refills: 0 | Status: SHIPPED | OUTPATIENT
Start: 2019-09-09 | End: 2019-11-11

## 2019-09-09 NOTE — TELEPHONE ENCOUNTER
"Last Written Prescription Date:  7/16/19  Last Fill Quantity: 60,  # refills: 0   Last office visit: No previous visit found with prescribing provider:  4/19/19   Future Office Visit:    Requested Prescriptions   Pending Prescriptions Disp Refills     CHANTIX 1 MG tablet [Pharmacy Med Name: Chantix Oral Tablet 1 MG] 60 tablet 0     Sig: TAKE 1 TABLET BY MOUTH TWICE DAILY       Partial Cholinergic Nicotinic Agonist Agents Passed - 9/7/2019  4:48 PM        Passed - Blood pressure under 140/90 in past 12 months     BP Readings from Last 3 Encounters:   04/19/19 124/62   08/29/18 157/69   08/15/18 141/65                 Passed - Recent (12 mo) or future (30 days) visit within the authorizing provider's specialty     Patient had office visit in the last 12 months or has a visit in the next 30 days with authorizing provider or within the authorizing provider's specialty.  See \"Patient Info\" tab in inbasket, or \"Choose Columns\" in Meds & Orders section of the refill encounter.              Passed - Medication is active on med list        Passed - Patient is 18 years of age or older        Prescription approved per [facility] [place]..    Kacie ADAMN, RN    "

## 2019-10-07 DIAGNOSIS — M85.80 OSTEOPENIA, UNSPECIFIED LOCATION: ICD-10-CM

## 2019-10-07 NOTE — TELEPHONE ENCOUNTER
"calcium carbonate 600 mg-vitamin D 400 units (CALTRATE) 600-400 MG-UNIT per tablet  Last Written Prescription Date:  7/9/19  Last Fill Quantity: 180,  # refills: 0   Last office visit: 4/19/19    Requested Prescriptions   Pending Prescriptions Disp Refills     calcium carbonate 600 mg-vitamin D 400 units (CALTRATE) 600-400 MG-UNIT per tablet [Pharmacy Med Name: Calcium Carbonate-Vitamin D Oral Tablet 600-400 MG-UNIT] 180 tablet 0     Sig: TAKE 1 TABLET BY MOUTH TWICE DAILY       Vitamin Supplements (Adult) Protocol Passed - 10/7/2019  7:21 AM        Passed - High dose Vitamin D not ordered        Passed - Recent (12 mo) or future (30 days) visit within the authorizing provider's specialty     Patient has had an office visit with the authorizing provider or a provider within the authorizing providers department within the previous 12 mos or has a future within next 30 days. See \"Patient Info\" tab in inbasket, or \"Choose Columns\" in Meds & Orders section of the refill encounter.              Passed - Medication is active on med list        Prescription approved per [facility] Refill Protocol.    "

## 2019-10-29 ENCOUNTER — OFFICE VISIT (OUTPATIENT)
Dept: OTHER | Facility: CLINIC | Age: 74
End: 2019-10-29
Attending: INTERNAL MEDICINE
Payer: MEDICARE

## 2019-10-29 VITALS
RESPIRATION RATE: 16 BRPM | BODY MASS INDEX: 23.23 KG/M2 | HEART RATE: 63 BPM | SYSTOLIC BLOOD PRESSURE: 124 MMHG | DIASTOLIC BLOOD PRESSURE: 64 MMHG | HEIGHT: 74 IN | WEIGHT: 181 LBS | OXYGEN SATURATION: 100 %

## 2019-10-29 DIAGNOSIS — Z01.818 PREOP GENERAL PHYSICAL EXAM: Primary | ICD-10-CM

## 2019-10-29 PROCEDURE — 93010 ELECTROCARDIOGRAM REPORT: CPT | Mod: ZP | Performed by: INTERNAL MEDICINE

## 2019-10-29 PROCEDURE — 93005 ELECTROCARDIOGRAM TRACING: CPT

## 2019-10-29 PROCEDURE — 99214 OFFICE O/P EST MOD 30 MIN: CPT | Mod: ZP | Performed by: INTERNAL MEDICINE

## 2019-10-29 PROCEDURE — G0463 HOSPITAL OUTPT CLINIC VISIT: HCPCS

## 2019-10-29 ASSESSMENT — MIFFLIN-ST. JEOR: SCORE: 1625.76

## 2019-10-29 NOTE — PROGRESS NOTES
"Kaleb Pennington is a 74 year old male who presents for:  Chief Complaint   Patient presents with     RECHECK     pre-op for cataract surgery         Vitals:    Vitals:    10/29/19 1431   BP: 124/64   BP Location: Right arm   Patient Position: Chair   Cuff Size: Adult Regular   Pulse: 63   Resp: 16   SpO2: 100%   Weight: 181 lb (82.1 kg)   Height: 6' 2\" (1.88 m)       BMI:  Estimated body mass index is 23.24 kg/m  as calculated from the following:    Height as of this encounter: 6' 2\" (1.88 m).    Weight as of this encounter: 181 lb (82.1 kg).    Pain Score:  Data Unavailable        Riki Gonzalez, [facility] [place]  [address]  [facility] [address]  [phone]  Dept: [phone]    PRE-OP EVALUATION:  Today's date: 10/29/2019    Kaleb Pennington (: 1945) presents for pre-operative evaluation assessment as requested by Dr. Wright.  He requires evaluation and anesthesia risk assessment prior to undergoing surgery/procedure for treatment of Both cataract surgery .    Proposed Surgery/ Procedure: Both cataract surgery  Date of Surgery/ Procedure: 2019 (right eye) and 2019 (left eye)  Time of Surgery/ Procedure: [facility]/Surgical Facility: [facility] [place]     Primary Physician: Christopher Henry  Type of Anesthesia Anticipated: General    Patient has a Health Care Directive or Living Will:  YES          HPI:     HPI related to upcoming procedure: The patient began to experience difficulty driving at night with cloudy vision. He was seen by his opthalmologist and noted have bilateral cataracts.      See problem list for active medical problems.  Problems all longstanding and stable, except as noted/documented.  See ROS for pertinent symptoms related to these conditions.      MEDICAL HISTORY:     Patient Active Problem List    Diagnosis Date Noted     Venous ulcer of ankle, right (H) 2018     Priority: Medium "      Past Medical History:   Diagnosis Date     Hypertension      Past Surgical History:   Procedure Laterality Date     ORTHOPEDIC SURGERY       Current Outpatient Medications   Medication Sig Dispense Refill     alendronate (FOSAMAX) 35 MG tablet Take 1 tablet (35 mg) by mouth every 7 days **NEEDS TO BE SEEN FOR MORE REFILLS** 13 tablet 3     atorvastatin (LIPITOR) 40 MG tablet TAKE 1 TABLET BY MOUTH ONE TIME DAILY  90 tablet 0     calcium carbonate 600 mg-vitamin D 400 units (CALTRATE) 600-400 MG-UNIT per tablet TAKE 1 TABLET BY MOUTH TWICE DAILY  180 tablet 0     CHANTIX 1 MG tablet TAKE 1 TABLET BY MOUTH TWICE DAILY 60 tablet 0     FOLIC ACID PO Take 1 mg by mouth daily       losartan (COZAAR) 100 MG tablet TAKE 1 TABLET BY MOUTH ONE TIME DAILY  90 tablet 0     Methotrexate Sodium (METHOTREXATE PO) Take 6 tablets by mouth once a week       metoprolol tartrate (LOPRESSOR) 25 MG tablet Take 0.5 tablets (12.5 mg) by mouth 2 times daily 90 tablet 2     order for DME [employer]  Phone #[phone]  Fax #[phone]  Ready To Wear Knee High Compression Stockings  30-40 mmHg  Length of Need: Life Time  # of Pairs 3 3 Device 0     OTC products: none    Allergies   Allergen Reactions     Bee Venom Anaphylaxis      Latex Allergy: NO    Social History     Tobacco Use     Smoking status: Light Tobacco Smoker     Packs/day: 0.25     Years: 50.00     Pack years: 12.50     Types: Cigarettes     Smokeless tobacco: Never Used   Substance Use Topics     Alcohol use: No     Alcohol/week: 0.0 standard drinks     History   Drug Use No       REVIEW OF SYSTEMS:   CONSTITUTIONAL: NEGATIVE for fever, chills, change in weight  INTEGUMENTARY/SKIN: NEGATIVE for worrisome rashes, moles or lesions  EYES: POSITIVE for blurred vision bilateral  ENT/MOUTH: NEGATIVE for ear, mouth and throat problems  RESP: NEGATIVE for significant cough or SOB  BREAST: NEGATIVE for masses, tenderness or discharge  CV: NEGATIVE for chest  pain, palpitations or peripheral edema  GI: NEGATIVE for nausea, abdominal pain, heartburn, or change in bowel habits  : NEGATIVE for frequency, dysuria, or hematuria  MUSCULOSKELETAL: NEGATIVE for significant arthralgias or myalgia  NEURO: NEGATIVE for weakness, dizziness or paresthesias  ENDOCRINE: NEGATIVE for temperature intolerance, skin/hair changes  HEME: NEGATIVE for bleeding problems  PSYCHIATRIC: NEGATIVE for changes in mood or affect    EXAM:   There were no vitals taken for this visit.    GENERAL APPEARANCE: healthy, alert and no distress     EYES: bilateral cataracts     HENT: ear canals and TM's normal and nose and mouth without ulcers or lesions     NECK: no adenopathy, no asymmetry, masses, or scars and thyroid normal to palpation     RESP: lungs clear to auscultation - no rales, rhonchi or wheezes     CV: regular rates and rhythm, normal S1 S2, no S3 or S4 and no murmur, click or rub     ABDOMEN:  soft, nontender, no HSM or masses and bowel sounds normal     MS: extremities normal- no gross deformities noted, no evidence of inflammation in joints, FROM in all extremities.     SKIN: no suspicious lesions or rashes     NEURO: Normal strength and tone, sensory exam grossly normal, mentation intact and speech normal     PSYCH: mentation appears normal. and affect normal/bright     LYMPHATICS: No cervical adenopathy    DIAGNOSTICS:   EKG: First degree AV block with left bundle branch block, no LVH by voltage criteria    Recent Labs   Lab Test 04/05/19  1030 12/19/18  1015  09/27/18  1046  02/15/17  1320   HGB  --  13.8  --  13.6   < > 15.8   PLT  --  249  --  222   < >  --    INR  --   --   --   --   --  0.89    136  --   --    < >  --    POTASSIUM 4.3 4.5  --   --    < >  --    CR 0.93 0.83   < > 0.89   < >  --    A1C 5.8* 5.7*  --   --    < >  --     < > = values in this interval not displayed.        IMPRESSION:   Reason for surgery/procedure: cataracts  Diagnosis/reason for consult:  htn    The proposed surgical procedure is considered LOW risk.    REVISED CARDIAC RISK INDEX  The patient has the following serious cardiovascular risks for perioperative complications such as (MI, PE, VFib and 3  AV Block):  No serious cardiac risks  INTERPRETATION: 0 risks: Class I (very low risk - 0.4% complication rate)    The patient has the following additional risks for perioperative complications:  No identified additional risks      ICD-10-CM    1. Preop general physical exam Z01.818 EKG 12-lead complete w/read - Clinics       RECOMMENDATIONS:         --Patient is to take all scheduled medications on the day of surgery.    APPROVAL GIVEN to proceed with proposed procedure, without further diagnostic evaluation       Signed Electronically by: Christopher Henry MD    Copy of this evaluation report is provided to requesting physician.    [facility] Preop Guidelines    Revised Cardiac Risk Index

## 2019-11-11 ENCOUNTER — TELEPHONE (OUTPATIENT)
Dept: OTHER | Facility: CLINIC | Age: 74
End: 2019-11-11

## 2019-11-11 DIAGNOSIS — Z72.0 TOBACCO ABUSE: ICD-10-CM

## 2019-11-11 RX ORDER — VARENICLINE TARTRATE 1 MG/1
TABLET, FILM COATED ORAL
Qty: 60 TABLET | Refills: 0 | Status: SHIPPED | OUTPATIENT
Start: 2019-11-11 | End: 2019-12-10

## 2019-11-11 NOTE — TELEPHONE ENCOUNTER
"CHANTIX 1 MG tablet  Last Written Prescription Date:  9/9/19  Last Fill Quantity: 60,  # refills: 0   Last office visit:  10/29/19  Requested Prescriptions   Pending Prescriptions Disp Refills     CHANTIX 1 MG tablet [Pharmacy Med Name: Chantix Oral Tablet 1 MG] 60 tablet 0     Sig: TAKE 1 TABLET BY MOUTH TWICE DAILY       Partial Cholinergic Nicotinic Agonist Agents Passed - 11/11/2019  8:57 AM        Passed - Blood pressure under 140/90 in past 12 months     BP Readings from Last 3 Encounters:   10/29/19 124/64   04/19/19 124/62   08/29/18 157/69                 Passed - Recent (12 mo) or future (30 days) visit within the authorizing provider's specialty     Patient has had an office visit with the authorizing provider or a provider within the authorizing providers department within the previous 12 mos or has a future within next 30 days. See \"Patient Info\" tab in inbasket, or \"Choose Columns\" in Meds & Orders section of the refill encounter.              Passed - Medication is active on med list        Passed - Patient is 18 years of age or older        Prescription approved per [facility] [place].  Rosibel Corado RN BSN  [facility]        "
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
## 2019-11-12 NOTE — TELEPHONE ENCOUNTER
Central Prior Authorization Team   Phone: [phone]    PA Initiation    Medication: Chantix  Insurance Company: [employer] - Phone [phone] Fax [phone]  Pharmacy Filling the Rx: [id number] - [place] - [address]   Filling Pharmacy Phone: [phone]  Filling Pharmacy Fax: [phone]  Start Date: 11/12/2019

## 2019-11-13 NOTE — TELEPHONE ENCOUNTER
Prior Authorization Approval    Authorization Effective Date: 12/30/2018  Authorization Expiration Date: 5/12/2020  Medication: Chantix-APPROVED  Approved Dose/Quantity:    Reference #:     Insurance Company: [employer] - Phone [phone] Fax [phone]  Expected CoPay:       CoPay Card Available:      Foundation Assistance Needed:    Which Pharmacy is filling the prescription (Not needed for infusion/clinic administered): [id number] - [place], [place] - [address]  Pharmacy Notified: Yes  Patient Notified: Yes  **Instructed pharmacy to notify patient when script is ready to /ship.**

## 2019-12-02 DIAGNOSIS — E78.5 HYPERLIPIDEMIA LDL GOAL <100: ICD-10-CM

## 2019-12-02 DIAGNOSIS — I10 ESSENTIAL HYPERTENSION: ICD-10-CM

## 2019-12-02 RX ORDER — LOSARTAN POTASSIUM 100 MG/1
TABLET ORAL
Qty: 90 TABLET | Refills: 1 | Status: SHIPPED | OUTPATIENT
Start: 2019-12-02 | End: 2021-12-24

## 2019-12-02 RX ORDER — ATORVASTATIN CALCIUM 40 MG/1
TABLET, FILM COATED ORAL
Qty: 90 TABLET | Refills: 1 | Status: SHIPPED | OUTPATIENT
Start: 2019-12-02 | End: 2021-12-24

## 2019-12-02 NOTE — TELEPHONE ENCOUNTER
"atorvastatin (LIPITOR) 40 MG tablet  Last Written Prescription Date:  9/3/19  Last Fill Quantity: 90,  # refills: 0     losartan (COZAAR) 100 MG tablet  Last Written Prescription Date:  9/3/19  Last Fill Quantity: 90,  # refills: 0     Last office visit: 10/29/19    Requested Prescriptions   Pending Prescriptions Disp Refills     atorvastatin (LIPITOR) 40 MG tablet [Pharmacy Med Name: Atorvastatin Calcium Oral Tablet 40 MG] 90 tablet 0     Sig: TAKE 1 TABLET BY MOUTH ONE TIME DAILY       Statins Protocol Passed - 12/2/2019  7:08 AM        Passed - LDL on file in past 12 months     Recent Labs   Lab Test 04/05/19  1030   LDL 69             Passed - No abnormal creatine kinase in past 12 months     No lab results found.             Passed - Recent (12 mo) or future (30 days) visit within the authorizing provider's specialty     Patient has had an office visit with the authorizing provider or a provider within the authorizing providers department within the previous 12 mos or has a future within next 30 days. See \"Patient Info\" tab in inbasket, or \"Choose Columns\" in Meds & Orders section of the refill encounter.              Passed - Medication is active on med list        Passed - Patient is age 18 or older        losartan (COZAAR) 100 MG tablet [Pharmacy Med Name: Losartan Potassium Oral Tablet 100 MG] 90 tablet 0     Sig: TAKE 1 TABLET BY MOUTH ONE TIME DAILY       Angiotensin-II Receptors Passed - 12/2/2019  7:08 AM        Passed - Last blood pressure under 140/90 in past 12 months     BP Readings from Last 3 Encounters:   10/29/19 124/64   04/19/19 124/62   08/29/18 157/69                 Passed - Recent (12 mo) or future (30 days) visit within the authorizing provider's specialty     Patient has had an office visit with the authorizing provider or a provider within the authorizing providers department within the previous 12 mos or has a future within next 30 days. See \"Patient Info\" tab in in[employer]iot [employer]et, or \"Choose " "Columns\" in Meds & Orders section of the refill encounter.              Passed - Medication is active on med list        Passed - Patient is age 18 or older        Passed - Normal serum creatinine on file in past 12 months     Recent Labs   Lab Test 04/05/19  1030  03/03/17  1330   CR 0.93   < >  --    CREAT  --   --  1.0    < > = values in this interval not displayed.             Passed - Normal serum potassium on file in past 12 months     Recent Labs   Lab Test 04/05/19  1030   POTASSIUM 4.3                    Prescription approved per [facility] Refill Protocol.  Rosibel Corado RN BSN  [facility]      "

## 2019-12-05 ENCOUNTER — TELEPHONE (OUTPATIENT)
Dept: OTHER | Facility: CLINIC | Age: 74
End: 2019-12-05

## 2019-12-05 NOTE — TELEPHONE ENCOUNTER
[facility]    Who is the name of the provider?:  Elaine      What is the location you see this provider at?: [place]    Reason for call: Needs letter verifying that medically he is fit to drive faxed to [place] [employer] Public [employer]: [phone]  Must have by first of next week or will cancel his license.  Ophthalmologist has verified for eyesight.     Can we leave a detailed message on this number?  YES

## 2019-12-05 NOTE — LETTER
[facility] at [address] [place]  [place] [address]  Phone: [phone]  Fax: [phone]      December 5, 2019    Kaleb Pennington  [address]  [place] [address]    To whom it may concern:    Kaleb Pennington was evaluated on 10/21/19.  At the time of that appointment, Kaleb's condition was such that he would be able to safely operate a motor vehicle upon the public streets and highway.    His surgery for cataracts and macular degeneration was reviewed by his vision examiner Deangelo Wright, who has provided a form stating that the patient's vision is acceptable with corrective lenses for daytime driving.    I will defer to that decision and see no other health concerns presently that would impair Kaleb's driving ability.      Sincerely,            Christopher Henry MD

## 2019-12-05 NOTE — TELEPHONE ENCOUNTER
Ghazal signed, faxed as requested to [employer] and [employer] [phone]    Fax confirmed at 15:03.  All of these documents, including confirmation faxed to patient at [phone].    Called patient to notify him.  Rosibel Corado RN BSN  [facility]

## 2019-12-05 NOTE — TELEPHONE ENCOUNTER
Called Ed back.  He explained that he needs a letter of medical fitness to drive as well as a note from the ophthalmologist (which has already been sent to the DM[employer]).  I  requested that he re-fax the information from his ophthalmologist.

## 2019-12-10 DIAGNOSIS — Z72.0 TOBACCO ABUSE: ICD-10-CM

## 2019-12-10 RX ORDER — VARENICLINE TARTRATE 1 MG/1
TABLET, FILM COATED ORAL
Qty: 60 TABLET | Refills: 0 | Status: SHIPPED | OUTPATIENT
Start: 2019-12-10 | End: 2021-12-24

## 2020-02-28 DIAGNOSIS — I10 ESSENTIAL HYPERTENSION: ICD-10-CM

## 2020-02-28 RX ORDER — METOPROLOL TARTRATE 25 MG/1
TABLET, FILM COATED ORAL
Qty: 90 TABLET | Refills: 1 | Status: SHIPPED | OUTPATIENT
Start: 2020-02-28 | End: 2021-12-24

## 2020-02-28 NOTE — TELEPHONE ENCOUNTER
"metoprolol tartrate (LOPRESSOR) 25 MG tablet  Last Written Prescription Date:  4/19/19  Last Fill Quantity: 90,  # refills: 2   Last office visit: 10/29/19    Requested Prescriptions   Pending Prescriptions Disp Refills     metoprolol tartrate (LOPRESSOR) 25 MG tablet [Pharmacy Med Name: Metoprolol Tartrate Oral Tablet 25 MG] 90 tablet 1     Sig: TAKE 1/2 TABLET BY MOUTH TWICE DAILY       Beta-Blockers Protocol Passed - 2/28/2020  7:19 AM        Passed - Blood pressure under 140/90 in past 12 months     BP Readings from Last 3 Encounters:   10/29/19 124/64   04/19/19 124/62   08/29/18 157/69                 Passed - Patient is age 6 or older        Passed - Recent (12 mo) or future (30 days) visit within the authorizing provider's specialty     Patient has had an office visit with the authorizing provider or a provider within the authorizing providers department within the previous 12 mos or has a future within next 30 days. See \"Patient Info\" tab in inbasket, or \"Choose Columns\" in Meds & Orders section of the refill encounter.              Passed - Medication is active on med list        Prescription approved per [facility] Refill Protocol.  Rosibel Corado RN BSN  [facility]        "

## 2020-06-02 NOTE — PROGRESS NOTES
[facility]    HISTORY OF PRESENT ILLNESS: Kaleb Pennington is a 73 year old male who returns today for a right medial ankle wound that has been present since about April. He believe it started when he hit his leg on his bed frame. He has no other history of wounds in this area but has signs of venous disease bilaterally. Has never been diagnosed with venous disease but does have PAD worse in his left leg. Does not typically wear compression socks. 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     For the past few weeks he has been coming to our office for 2-layer dynamic compression wraps. He has responded well to this. Today the wound is nearly healed.    DATE WOUND ACQUIRED: 4/2108    PAST MEDICAL HISTORY: inflammatory AAA, moderate PAD in left leg and borderline in right leg, limb length discrepancy, low back pain    MEDICATIONS:   Current Outpatient Prescriptions   Medication     alendronate (FOSAMAX) 35 MG tablet     atorvastatin (LIPITOR) 40 MG tablet     calcium carbonate-vitamin D 600-400 MG-UNIT CHEW     CHANTIX 1 MG tablet     FOLIC ACID PO     losartan (COZAAR) 100 MG tablet     Methotrexate Sodium (METHOTREXATE PO)     metoprolol (LOPRESSOR) 25 MG tablet     nicotine (NICODERM CQ) 14 MG/24HR 24 hr patch     nicotine (NICODERM CQ) 7 MG/24HR 24 hr patch     order for DME     predniSONE (DELTASONE) 10 MG tablet     varenicline (CHANTIX STARTING MONTH PAK) 0.5 MG X 11 & 1 MG X 42 tablet     No current facility-administered medications for this encounter.      REVIEW OF SYSTEMS:  CONSTITUTIONAL: Denies fevers or acute illness  ENDOCRINE: Denies diabetes    VITALS: /80  Pulse 74  Temp 97.6  F (36.4  C) (Temporal)  Resp 16    PHYSICAL EXAM:  GENERAL: Patient is alert and oriented and in no acute distress  INTEGUMENTARY:   WOUND ASSESSMENT #1:     Location: right medial ankle     Size: 0.4 cm x 0.3  Patient will wait until Thursday if her pharmacy opens back up.    cm with a depth of 0.1 cm    Drainage: moderate amount of serosanguinous drainage    Wound description: adherent yellow slough        PROCEDURE: Per standing order, topical 4% lidocaine was applied to the wound by the [facility]. The wound was mechanically debrided.     ASSESSMENT: full-thickness, venous stasis ulcer of right lower leg with fat-layer exposed    PLAN:   1. Primary dressing: MediHoney; Secondary dressing: BandAid  2. Compression is olsen, provided SpandaGrip today and order for compression socks    FOLLOW-UP: 2 weeks    REID SHANNON PA-C ([facility])

## 2020-06-17 ENCOUNTER — TELEPHONE (OUTPATIENT)
Dept: OTHER | Facility: CLINIC | Age: 75
End: 2020-06-17
Payer: MEDICARE

## 2020-06-17 NOTE — TELEPHONE ENCOUNTER
June 17, 2020    Contacted patient 5/27/2020 and 6/17/2020 to schedule appointments per follow-up orders that were expected to be completed by April 2020.     Patient was notified that this was our final attempt to contact them to schedule this follow-up appointment and it was requested that the patient call [facility] when they receive this message to schedule a follow-up appointment with Dr. Henry.    No further attempts will be made to contact patient for scheduling per these follow-up orders.     Stefanie Rothman    [facility]  Office: [phone]  Fax [phone]

## 2021-11-29 NOTE — TELEPHONE ENCOUNTER
[facility]     Who is the name of the provider? Dr Henry    What is the location you see this provider at? [place]       Reason for call:  Pt calling to schedule his appointment that was due May 2020. Please update orders/imaging prior to scheduling.    :  Kaleb    Phone number to call:  [phone]    Additional Notes:

## 2021-12-02 NOTE — TELEPHONE ENCOUNTER
.LM for patient to call us back to schedule:    Please return patient's call and arrange for:       Fasting labs    In clinic visit one week later.

## 2021-12-09 ENCOUNTER — LAB (OUTPATIENT)
Dept: LAB | Facility: CLINIC | Age: 76
End: 2021-12-09
Payer: MEDICARE

## 2021-12-09 DIAGNOSIS — E78.5 HYPERLIPIDEMIA LDL GOAL <100: ICD-10-CM

## 2021-12-09 DIAGNOSIS — R73.01 IMPAIRED FASTING GLUCOSE: ICD-10-CM

## 2021-12-09 DIAGNOSIS — Z12.5 SCREENING FOR PROSTATE CANCER: ICD-10-CM

## 2021-12-09 DIAGNOSIS — Z00.00 MEDICARE ANNUAL WELLNESS VISIT, SUBSEQUENT: ICD-10-CM

## 2021-12-09 LAB
ALBUMIN SERPL-MCNC: 3.9 G/DL (ref 3.5–5)
ALP SERPL-CCNC: 79 U/L (ref 45–120)
ALT SERPL W P-5'-P-CCNC: <9 U/L (ref 0–45)
ANION GAP SERPL CALCULATED.3IONS-SCNC: 6 MMOL/L (ref 5–18)
AST SERPL W P-5'-P-CCNC: 18 U/L (ref 0–40)
BILIRUB DIRECT SERPL-MCNC: 0.2 MG/DL
BILIRUB SERPL-MCNC: 0.6 MG/DL (ref 0–1)
BUN SERPL-MCNC: 14 MG/DL (ref 8–28)
CALCIUM SERPL-MCNC: 9.4 MG/DL (ref 8.5–10.5)
CHLORIDE BLD-SCNC: 104 MMOL/L (ref 98–107)
CHOLEST SERPL-MCNC: 171 MG/DL
CO2 SERPL-SCNC: 26 MMOL/L (ref 22–31)
CREAT SERPL-MCNC: 1.04 MG/DL (ref 0.7–1.3)
CREAT UR-MCNC: 189 MG/DL
FASTING STATUS PATIENT QL REPORTED: YES
GFR SERPL CREATININE-BSD FRML MDRD: 69 ML/MIN/1.73M2
GLUCOSE BLD-MCNC: 86 MG/DL (ref 70–125)
HBA1C MFR BLD: 5.7 % (ref 0–5.6)
HDLC SERPL-MCNC: 46 MG/DL
LDLC SERPL CALC-MCNC: 117 MG/DL
MICROALBUMIN UR-MCNC: 3.13 MG/DL (ref 0–1.99)
MICROALBUMIN/CREAT UR: 16.6 MG/G CR
POTASSIUM BLD-SCNC: 4 MMOL/L (ref 3.5–5)
PROT SERPL-MCNC: 6.4 G/DL (ref 6–8)
PSA SERPL-MCNC: 0.54 UG/L (ref 0–6.5)
SODIUM SERPL-SCNC: 136 MMOL/L (ref 136–145)
T3FREE SERPL-MCNC: 2.5 PG/ML (ref 1.6–3.9)
T4 FREE SERPL-MCNC: 0.92 NG/DL (ref 0.7–1.8)
TRIGL SERPL-MCNC: 40 MG/DL
TSH SERPL DL<=0.005 MIU/L-ACNC: 1.69 UIU/ML (ref 0.3–5)

## 2021-12-09 PROCEDURE — 36415 COLL VENOUS BLD VENIPUNCTURE: CPT

## 2021-12-09 PROCEDURE — 83036 HEMOGLOBIN GLYCOSYLATED A1C: CPT

## 2021-12-09 PROCEDURE — 80053 COMPREHEN METABOLIC PANEL: CPT

## 2021-12-09 PROCEDURE — 84439 ASSAY OF FREE THYROXINE: CPT

## 2021-12-09 PROCEDURE — 82043 UR ALBUMIN QUANTITATIVE: CPT

## 2021-12-09 PROCEDURE — 82248 BILIRUBIN DIRECT: CPT

## 2021-12-09 PROCEDURE — 84481 FREE ASSAY (FT-3): CPT

## 2021-12-09 PROCEDURE — 80061 LIPID PANEL: CPT

## 2021-12-09 PROCEDURE — 84443 ASSAY THYROID STIM HORMONE: CPT

## 2021-12-09 PROCEDURE — G0103 PSA SCREENING: HCPCS

## 2021-12-24 ENCOUNTER — OFFICE VISIT (OUTPATIENT)
Dept: OTHER | Facility: CLINIC | Age: 76
End: 2021-12-24
Attending: INTERNAL MEDICINE
Payer: MEDICARE

## 2021-12-24 VITALS
BODY MASS INDEX: 24.29 KG/M2 | DIASTOLIC BLOOD PRESSURE: 76 MMHG | OXYGEN SATURATION: 98 % | HEART RATE: 75 BPM | WEIGHT: 189.2 LBS | SYSTOLIC BLOOD PRESSURE: 178 MMHG

## 2021-12-24 DIAGNOSIS — K86.89 PANCREATIC MASS: ICD-10-CM

## 2021-12-24 DIAGNOSIS — I35.0 NONRHEUMATIC AORTIC VALVE STENOSIS: ICD-10-CM

## 2021-12-24 DIAGNOSIS — R73.01 IMPAIRED FASTING GLUCOSE: ICD-10-CM

## 2021-12-24 DIAGNOSIS — I71.40 ABDOMINAL AORTIC ANEURYSM (AAA) WITHOUT RUPTURE (H): ICD-10-CM

## 2021-12-24 DIAGNOSIS — Z00.00 MEDICARE ANNUAL WELLNESS VISIT, SUBSEQUENT: ICD-10-CM

## 2021-12-24 DIAGNOSIS — I71.21 ANEURYSM OF ASCENDING AORTA (H): ICD-10-CM

## 2021-12-24 DIAGNOSIS — M85.80 OSTEOPENIA, UNSPECIFIED LOCATION: ICD-10-CM

## 2021-12-24 DIAGNOSIS — I10 ESSENTIAL HYPERTENSION: ICD-10-CM

## 2021-12-24 DIAGNOSIS — Z12.5 SCREENING FOR PROSTATE CANCER: Primary | ICD-10-CM

## 2021-12-24 DIAGNOSIS — Z72.0 TOBACCO ABUSE: ICD-10-CM

## 2021-12-24 DIAGNOSIS — E78.5 HYPERLIPIDEMIA LDL GOAL <100: ICD-10-CM

## 2021-12-24 PROCEDURE — G0439 PPPS, SUBSEQ VISIT: HCPCS | Performed by: INTERNAL MEDICINE

## 2021-12-24 PROCEDURE — 99214 OFFICE O/P EST MOD 30 MIN: CPT | Mod: 25 | Performed by: INTERNAL MEDICINE

## 2021-12-24 PROCEDURE — G0463 HOSPITAL OUTPT CLINIC VISIT: HCPCS

## 2021-12-24 RX ORDER — ALENDRONATE SODIUM 35 MG/1
35 TABLET ORAL
Qty: 13 TABLET | Refills: 3 | Status: SHIPPED | OUTPATIENT
Start: 2021-12-24

## 2021-12-24 RX ORDER — ATORVASTATIN CALCIUM 40 MG/1
40 TABLET, FILM COATED ORAL DAILY
Qty: 90 TABLET | Refills: 3 | Status: SHIPPED | OUTPATIENT
Start: 2021-12-24

## 2021-12-24 RX ORDER — METOPROLOL TARTRATE 25 MG/1
TABLET, FILM COATED ORAL
Qty: 90 TABLET | Refills: 3 | Status: SHIPPED | OUTPATIENT
Start: 2021-12-24 | End: 2023-01-30

## 2021-12-24 RX ORDER — VARENICLINE TARTRATE 1 MG/1
1 TABLET, FILM COATED ORAL 2 TIMES DAILY
Qty: 120 TABLET | Refills: 0 | Status: SHIPPED | OUTPATIENT
Start: 2021-12-24 | End: 2022-04-11

## 2021-12-24 RX ORDER — LOSARTAN POTASSIUM 100 MG/1
100 TABLET ORAL DAILY
Qty: 90 TABLET | Refills: 3 | Status: SHIPPED | OUTPATIENT
Start: 2021-12-24 | End: 2023-01-30

## 2021-12-24 RX ORDER — VARENICLINE TARTRATE 1 MG/1
1 TABLET, FILM COATED ORAL 2 TIMES DAILY
Qty: 180 TABLET | Refills: 3 | Status: CANCELLED | OUTPATIENT
Start: 2021-12-24

## 2021-12-24 NOTE — PROGRESS NOTES
Kaleb Pennington is a 76 year old male who presents for          Medicare annual wellness visit, subsequent  Hyperlipidemia LDL goal <100  Impaired fasting glucose  Essential hypertension  Tobacco abuse  Pancreatic mass  Aneurysm of ascending aorta (H)  Abdominal aortic aneurysm (AAA) without rupture (H)  Nonrheumatic aortic valve stenosis  Osteopenia, unspecified location      Current providers caring for this patient include:  Patient Care Team:  Christopher Henry MD as PCP - General (Internal Medicine)         HPI: Kaleb Pennington is a 76 year old male with htn,. HLD, prior aortitis, ATAA, AAA, likely pancreatic IPMN who presents asymptomatically for annual medicare exam and f/u above medical issues as well as a lab and med review after having been lost to f/u for over two years. He has had some homelessness issues and other family issues which caused him to stop taking his meds.          Review Of Systems  Skin: negative  Eyes: negative  Ears/Nose/Throat: negative  Respiratory: No shortness of breath, dyspnea on exertion, cough, or hemoptysis  Cardiovascular: negative  Gastrointestinal: negative  Genitourinary: negative  Musculoskeletal: negative  Neurologic: negative  Psychiatric: negative  Hematologic/Lymphatic/Immunologic: negative  Endocrine: negative      Complete Medical and Social history reviewed with patient, outlined below.    Patient Active Problem List   Diagnosis     Venous ulcer of ankle, right (H)       Past Medical History:   Diagnosis Date     Hypertension        Past Surgical History:   Procedure Laterality Date     ORTHOPEDIC SURGERY         Family History   Problem Relation Age of Onset     Cancer Mother      Cancer Maternal Grandmother      Cancer Sister      Cancer Daughter        Social History     Tobacco Use     Smoking status: Former Smoker     Packs/day: 0.25     Years: 50.00     Pack years: 12.50     Types: Cigarettes     Smokeless tobacco: Never Used   Substance Use  Topics     Alcohol use: No     Alcohol/week: 0.0 standard drinks       Diet: regular, low salt/low fat  Physical Activity: generally inactive  Depression Screen:    Over the past 2 weeks, patient has felt down, depressed, or hopeless:  No    Over the past 2 weeks, patient has felt little interest or pleasure in doing things: No    Functional ability/Safety screen:  Up and go test (able to get up and walk longer than 30 seconds): Passed  Patient needs assistance with: nothing  Patient's home has the following possible safety concerns: none identified  Patient has concerns about his hearing:  No  Cognitive Screen  Patient repeats three objects (ball, flag, tree)      Clock drawing test:   NORMAL  Recalls three objects after 3 minutes (ball,flag,tree):                                                                                               recalls 3 objects (3 points)    Physical Exam:  BP (!) 178/76 (BP Location: Right arm, Patient Position: Chair, Cuff Size: Adult Regular)   Pulse 75   Wt 85.8 kg (189 lb 3.2 oz)   SpO2 98%   BMI 24.29 kg/m     Body mass index is 24.29 kg/m .          GENERAL APPEARANCE: healthy, alert and no distress  PSYCH: Affect normal/bright.  Mentation within normal limits.  EYES: conjunctiva clear  HENT: ear canals and TM's normal.  Nose and mouth without ulcers, erythema or lesions  NECK: supple, nontender, no lymphadenopathy  RESP: lungs clear to auscultation - no rales, rhonchi or wheezes  CV: regular rates and rhythm, normal S1 S2, no murmur noted  ABDOMEN:  soft, nontender, no HSM or masses and bowel sounds normal  SKIN: no suspicious lesions or rashes  NEURO: Normal strength and tone,  normal speech and mentation  Extremities: no peripheral edema or tenderness, peripheral pulses normal  MS: extremities normal- no gross deformities noted, no erythema, FROM noted in all extremities  PSYCH: mentation appears normal  LYMPHATICS: no cervical adenopathy    End of Life Planning:    Patient currently has an advanced directive: Yes.  Practitioner is supportive of decision.    Education/Counseling:   Based on review of the above information, the following items were addressed:      Elevated blood pressure - follow-up plans made    Appropriate preventive services were discussed with this patient, including applicable screening as appropriate for cardiovascular disease, diabetes, osteopenia/osteoporosis, and glaucoma.  As appropriate for age/gender, discussed screening for colorectal cancer, prostate cancer, breast cancer, and cervical cancer.   Checklist reviewing preventive services available has been given to the patient.          Component      Latest Ref Rng & Units 4/5/2019 12/9/2021   Sodium      136 - 145 mmol/L 141 136   Potassium      3.5 - 5.0 mmol/L 4.3 4.0   Chloride      98 - 107 mmol/L 108 104   Carbon Dioxide      22 - 31 mmol/L 27 26   Anion Gap      5 - 18 mmol/L 6 6   Glucose      70 - 125 mg/dL 95 86   Urea Nitrogen      8 - 28 mg/dL 15 14   Creatinine      0.70 - 1.30 mg/dL 0.93 1.04   GFR Estimate      >60 mL/min/1.73m2 80 69   GFR Estimate If Black      >60 mL/min/1.73:m2 >90    Calcium      8.5 - 10.5 mg/dL 9.5 9.4   Bilirubin Direct      <=0.5 mg/dL 0.2 0.2   Bilirubin Total      0.0 - 1.0 mg/dL 0.6 0.6   Albumin      3.5 - 5.0 g/dL 3.9 3.9   Protein Total      6.0 - 8.0 g/dL 7.3 6.4   Alkaline Phosphatase      40 - 150 U/L 101    ALT      0 - 45 U/L 26 <9   AST      0 - 40 U/L 29 18   Alkaline Phosphatase      45 - 120 U/L  79   Cholesterol      <=199 mg/dL 117 171   Triglycerides      <=149 mg/dL 58 40   HDL Cholesterol      >=40 mg/dL 36 (L) 46   LDL Cholesterol Calculated      <=129 mg/dL 69 117   Non HDL Cholesterol      <130 mg/dL 81    Patient Fasting > 8hrs?        Yes   Microalbumin Urine mg/dL      0.00 - 1.99 mg/dL  3.13 (H)   Creatinine Urine      mg/dL  189   Microalbumin Urine mg/g Cr      <=19.9 mg/g Cr  16.6   Hemoglobin A1C POCT      0 - 5.6 % 5.8 (H)     T4 Free      0.70 - 1.80 ng/dL 0.94 0.92   Free T3      2.3 - 4.2 pg/mL 2.4    TSH      0.40 - 4.00 mU/L 1.67    T3 Free      1.6 - 3.9 pg/mL  2.5   TSH      0.30 - 5.00 uIU/mL  1.69   Hemoglobin A1C      0.0 - 5.6 %  5.7 (H)   PSA      0.00 - 6.50 ug/L  0.54         A/P:    (Z00.00) Medicare annual wellness visit, subsequent  Comment: doing well  Plan: RTC one year for annual physical exam    (E78.5) Hyperlipidemia LDL goal <100  Comment: not at goal due to med noncompliance  Plan: atorvastatin (LIPITOR) 40 MG tablet, losartan         (COZAAR) 100 MG tablet            (R73.01) Impaired fasting glucose  Comment: avoid CHO  Plan: monitor A1C    (I10) Essential hypertension  Comment: not at goal due to med noncompliance  Plan: losartan (COZAAR) 100 MG tablet, metoprolol         tartrate (LOPRESSOR) 25 MG tablet        RTC three months for BP check on his BP meds; get home BP cuff monitor, RTC one month if on BP meds SBP> 130    (I71.2) 37 mm Ascending aortic aneurysm (H)  Comment: asx, lost to f/u  Plan: Check TTE in three months, RTC two weeks later    (Z72.0) Tobacco abuse  Comment: he has quit but would like to start back up on this  Plan: varenicline (CHANTIX) 1 MG tablet             (K86.89) Pancreatic mass  Comment: this is a likely IPMN  Plan: discuss with patient when seen next whether he has had hepatobiliary f/u of this, if not coordinate        (I71.4) Abdominal aortic aneurysm (AAA) without rupture (H)  Comment: lost to f/u  Plan: US Abdominal Aorta Imaging in three months, RTC two weeks later to discuss    (I35.0) Nonrheumatic aortic valve stenosis  Comment: no murmur  Plan: check TTE as above    (M85.80) Osteopenia, unspecified location, steroid and tobacco induced  Comment: needs the below  Plan: alendronate (FOSAMAX) 35 MG tablet, calcium         carbonate 600 mg-vitamin D 400 units (CALTRATE)        600-400 MG-UNIT per tablet            H/O aortitis  Pt advised to f/u with rheum.       35 minutes  not spent on annual Medicare exam were spent providing medical care regarding items #2 onward as above.

## 2021-12-24 NOTE — PROGRESS NOTES
[facility]        Patient is here for a  follow up.     Pt is currently taking no meds that would impact our treatment plan.    BP (!) 178/76 (BP Location: Right arm, Patient Position: Chair, Cuff Size: Adult Regular)   Pulse 75   Wt 189 lb 3.2 oz (85.8 kg)   SpO2 98%   BMI 24.29 kg/m      The provider has been notified that the patient has no concerns.     Questions patient would like addressed today are: N/A.    Refills are needed: N/A    Has homecare services and agency name:  Geraldine Isbell MA

## 2022-02-01 ENCOUNTER — TRANSFERRED RECORDS (OUTPATIENT)
Dept: HEALTH INFORMATION MANAGEMENT | Facility: CLINIC | Age: 77
End: 2022-02-01
Payer: MEDICARE

## 2022-02-10 ENCOUNTER — TELEPHONE (OUTPATIENT)
Dept: OTHER | Facility: CLINIC | Age: 77
End: 2022-02-10
Payer: MEDICARE

## 2022-02-10 NOTE — TELEPHONE ENCOUNTER
"Patient is due late March for a 3 month follow up to 12/24/21 including:      Fasting labs (prefers [place])    Abdominal Artery Ultrasound ( for surveillance of abdominal aortic aneurysm) (will have MRI abdomen @ [facility])    Echocardiogram.  (for surveillance of ascending aortic aneurysm) Scheduled 3/24/22.    In clinic visit two weeks after labs, one week after imaging.     Patient is having having an MRI of Abdominal Aortic Aneurysm at [facility] on 3/3/22.  he is asking\" Does this have a bearing on the tests and followup that Dr. Henry wants him to have?\"      Rosibel Corado RN BSN  [facility]  [phone]                "

## 2022-02-10 NOTE — TELEPHONE ENCOUNTER
[facility]     Who is the name of the provider? Elaine    What is the location you see this provider at?    [place]    Reason for call:  He is having an MRI of Abdominal Aortic Aneurysm at [facility] on 3/3/22.  Does this have a bearing on the tests and followup that Dr. Henry wants him to have?  Please review and advise.    :  Kaleb Pennington    Phone number to call:  [phone]    Additional Notes:      Ana Fournier    [facility]   [phone]

## 2022-02-22 NOTE — TELEPHONE ENCOUNTER
Lab    Scheduled Date: 3/17   Scheduled Time: 8:30  Provider:             Elaine    Ultrasound    Scheduled Date: 3/22  Scheduled Time: 10:00  Provider:             [email or web address]    Consult    Scheduled Date: 4/6  Scheduled Time: 10:10  Provider:             Elaine Suggs I   [facility]  [email or web address]  [phone]

## 2022-02-22 NOTE — TELEPHONE ENCOUNTER
Please arrange for the following in late March:        Fasting labs (prefers [facility])    In clinic visit two weeks after labs, one week after imaging (below)      Abdominal Artery Ultrasound ( for surveillance of abdominal aortic aneurysm) (will have MRI abdomen @ [facility] ON 3/3/22)    Echocardiogram.  (for surveillance of ascending aortic aneurysm) Scheduled 3/24/22.    Rosibel Corado RN BSN  [facility]  [phone]

## 2022-03-17 ENCOUNTER — LAB (OUTPATIENT)
Dept: LAB | Facility: CLINIC | Age: 77
End: 2022-03-17
Payer: MEDICARE

## 2022-03-17 DIAGNOSIS — E78.5 HYPERLIPIDEMIA LDL GOAL <100: ICD-10-CM

## 2022-03-17 DIAGNOSIS — Z12.5 SCREENING FOR PROSTATE CANCER: ICD-10-CM

## 2022-03-17 DIAGNOSIS — R73.01 IMPAIRED FASTING GLUCOSE: ICD-10-CM

## 2022-03-17 DIAGNOSIS — I10 ESSENTIAL HYPERTENSION: ICD-10-CM

## 2022-03-17 LAB
ALBUMIN SERPL-MCNC: 3.8 G/DL (ref 3.5–5)
ALP SERPL-CCNC: 83 U/L (ref 45–120)
ALT SERPL W P-5'-P-CCNC: 10 U/L (ref 0–45)
ANION GAP SERPL CALCULATED.3IONS-SCNC: 13 MMOL/L (ref 5–18)
AST SERPL W P-5'-P-CCNC: 18 U/L (ref 0–40)
BILIRUB SERPL-MCNC: 0.5 MG/DL (ref 0–1)
BUN SERPL-MCNC: 18 MG/DL (ref 8–28)
CALCIUM SERPL-MCNC: 9.5 MG/DL (ref 8.5–10.5)
CHLORIDE BLD-SCNC: 106 MMOL/L (ref 98–107)
CHOLEST SERPL-MCNC: 168 MG/DL
CO2 SERPL-SCNC: 23 MMOL/L (ref 22–31)
CREAT SERPL-MCNC: 1.24 MG/DL (ref 0.7–1.3)
CREAT UR-MCNC: 171 MG/DL
FASTING STATUS PATIENT QL REPORTED: YES
GFR SERPL CREATININE-BSD FRML MDRD: 60 ML/MIN/1.73M2
GLUCOSE BLD-MCNC: 87 MG/DL (ref 70–125)
HBA1C MFR BLD: 5.4 % (ref 0–5.6)
HDLC SERPL-MCNC: 43 MG/DL
LDLC SERPL CALC-MCNC: 116 MG/DL
MICROALBUMIN UR-MCNC: 1.57 MG/DL (ref 0–1.99)
MICROALBUMIN/CREAT UR: 9.2 MG/G CR
POTASSIUM BLD-SCNC: 4.4 MMOL/L (ref 3.5–5)
PROT SERPL-MCNC: 6.5 G/DL (ref 6–8)
PSA SERPL-MCNC: 0.57 UG/L (ref 0–6.5)
SODIUM SERPL-SCNC: 142 MMOL/L (ref 136–145)
T3FREE SERPL-MCNC: 2.6 PG/ML (ref 1.6–3.9)
T4 FREE SERPL-MCNC: 0.83 NG/DL (ref 0.7–1.8)
TRIGL SERPL-MCNC: 46 MG/DL
TSH SERPL DL<=0.005 MIU/L-ACNC: 2.66 UIU/ML (ref 0.3–5)

## 2022-03-17 PROCEDURE — 84439 ASSAY OF FREE THYROXINE: CPT

## 2022-03-17 PROCEDURE — 84481 FREE ASSAY (FT-3): CPT

## 2022-03-17 PROCEDURE — 83036 HEMOGLOBIN GLYCOSYLATED A1C: CPT

## 2022-03-17 PROCEDURE — 36415 COLL VENOUS BLD VENIPUNCTURE: CPT

## 2022-03-17 PROCEDURE — 82043 UR ALBUMIN QUANTITATIVE: CPT

## 2022-03-17 PROCEDURE — 80061 LIPID PANEL: CPT

## 2022-03-17 PROCEDURE — 80053 COMPREHEN METABOLIC PANEL: CPT

## 2022-03-17 PROCEDURE — 84443 ASSAY THYROID STIM HORMONE: CPT

## 2022-03-17 PROCEDURE — G0103 PSA SCREENING: HCPCS

## 2022-03-22 NOTE — TELEPHONE ENCOUNTER
Pt called 3/22/22 to cancel his US due to the rain- he has poor eyesight and cannot find a ride. He will call back to schedule.

## 2022-03-24 ENCOUNTER — HOSPITAL ENCOUNTER (OUTPATIENT)
Dept: CARDIOLOGY | Facility: CLINIC | Age: 77
Discharge: HOME | End: 2022-03-24
Attending: INTERNAL MEDICINE | Admitting: INTERNAL MEDICINE
Payer: MEDICARE

## 2022-03-24 DIAGNOSIS — I71.21 ANEURYSM OF ASCENDING AORTA (H): ICD-10-CM

## 2022-03-24 LAB — LVEF ECHO: NORMAL

## 2022-03-24 PROCEDURE — 93306 TTE W/DOPPLER COMPLETE: CPT | Mod: 26 | Performed by: INTERNAL MEDICINE

## 2022-03-24 PROCEDURE — 255N000002 HC RX 255 OP 636: Performed by: INTERNAL MEDICINE

## 2022-03-24 PROCEDURE — 999N000208 ECHOCARDIOGRAM COMPLETE

## 2022-03-24 RX ADMIN — PERFLUTREN 3 ML: 6.52 INJECTION, SUSPENSION INTRAVENOUS at 12:04

## 2022-04-10 DIAGNOSIS — Z72.0 TOBACCO ABUSE: ICD-10-CM

## 2022-04-11 RX ORDER — VARENICLINE TARTRATE 1 MG/1
1 TABLET, FILM COATED ORAL 2 TIMES DAILY
Qty: 120 TABLET | Refills: 0 | Status: SHIPPED | OUTPATIENT
Start: 2022-04-11 | End: 2022-10-25

## 2022-04-11 NOTE — TELEPHONE ENCOUNTER
varenicline (CHANTIX) 1 MG tablet  Last Written Prescription Date:  12/24/21  Last Fill Quantity: 120,  # refills: 0     Last office visit: 12/24/2021      Partial Cholinergic Nicotinic Agonist Agents Failed 04/10/2022 04:02 PM   Protocol Details  Blood pressure under 140/90 in past 12 months           Rosibel Corado RN BSN  [facility]  [phone]

## 2022-05-13 ENCOUNTER — TELEPHONE (OUTPATIENT)
Dept: OTHER | Facility: CLINIC | Age: 77
End: 2022-05-13
Payer: MEDICARE

## 2022-05-13 DIAGNOSIS — I71.40 ABDOMINAL AORTIC ANEURYSM (AAA) WITHOUT RUPTURE (H): Primary | ICD-10-CM

## 2022-05-16 NOTE — TELEPHONE ENCOUNTER
Document received   LOV 12/24/21  Follow up recommended:      March/April 2022    Fasting labs (done 3/17/22)  Abdominal Artery Ultrasound   Echocardiogram. (done 3/24/22)  In clinic visit two weeks after labs, one week after imaging.     Pt called 3/22/22 to cancel his Abdominal Artery US due to the rain- he has poor eyesight and cannot find a ride  He does have an eye appointment at 2:15 today.    Appointment for follow up with Dr. Henry was also cancelled.    Called patient to let him know he still needs:      Abdominal aorta US    In clinic visit with Dr. Henry to discuss results a few days later. (time held 5/23 at 11:40).

## 2022-05-16 NOTE — TELEPHONE ENCOUNTER
Patient calling to verify form for renewal of 's  Licence has been received and status.       Phone: [phone]  [place]

## 2022-05-17 NOTE — TELEPHONE ENCOUNTER
Future Appointments   Date Time Provider Department Center   5[facility]18/2022  [id number]   5/23/2022 11:40 AM Christopher Henry MD [facility]

## 2022-05-18 ENCOUNTER — HOSPITAL ENCOUNTER (OUTPATIENT)
Dept: ULTRASOUND IMAGING | Facility: CLINIC | Age: 77
Discharge: HOME | End: 2022-05-18
Attending: INTERNAL MEDICINE | Admitting: INTERNAL MEDICINE
Payer: MEDICARE

## 2022-05-18 DIAGNOSIS — I71.40 ABDOMINAL AORTIC ANEURYSM (AAA) WITHOUT RUPTURE (H): ICD-10-CM

## 2022-05-18 PROCEDURE — 93978 VASCULAR STUDY: CPT

## 2022-05-23 ENCOUNTER — OFFICE VISIT (OUTPATIENT)
Dept: OTHER | Facility: CLINIC | Age: 77
End: 2022-05-23
Attending: INTERNAL MEDICINE
Payer: MEDICARE

## 2022-05-23 VITALS — OXYGEN SATURATION: 100 % | SYSTOLIC BLOOD PRESSURE: 130 MMHG | HEART RATE: 68 BPM | DIASTOLIC BLOOD PRESSURE: 66 MMHG

## 2022-05-23 DIAGNOSIS — I71.40 ABDOMINAL AORTIC ANEURYSM (AAA) WITHOUT RUPTURE (H): Primary | ICD-10-CM

## 2022-05-23 DIAGNOSIS — K86.89 PANCREATIC MASS: ICD-10-CM

## 2022-05-23 DIAGNOSIS — I51.9 DECREASED LEFT VENTRICULAR SYSTOLIC FUNCTION: ICD-10-CM

## 2022-05-23 DIAGNOSIS — I35.1 MODERATE AORTIC REGURGITATION: ICD-10-CM

## 2022-05-23 DIAGNOSIS — E78.5 HYPERLIPIDEMIA LDL GOAL <100: ICD-10-CM

## 2022-05-23 DIAGNOSIS — R73.01 IMPAIRED FASTING GLUCOSE: ICD-10-CM

## 2022-05-23 DIAGNOSIS — I42.9 CARDIOMYOPATHY, UNSPECIFIED TYPE (H): ICD-10-CM

## 2022-05-23 DIAGNOSIS — I71.21 THORACIC ASCENDING AORTIC ANEURYSM (H): ICD-10-CM

## 2022-05-23 DIAGNOSIS — I51.7 LEFT ATRIAL DILATION: ICD-10-CM

## 2022-05-23 DIAGNOSIS — I10 ESSENTIAL HYPERTENSION: ICD-10-CM

## 2022-05-23 DIAGNOSIS — I35.0 MILD AORTIC STENOSIS: ICD-10-CM

## 2022-05-23 PROCEDURE — G0463 HOSPITAL OUTPT CLINIC VISIT: HCPCS

## 2022-05-23 PROCEDURE — 93000 ELECTROCARDIOGRAM COMPLETE: CPT | Performed by: INTERNAL MEDICINE

## 2022-05-23 PROCEDURE — 99215 OFFICE O/P EST HI 40 MIN: CPT | Performed by: INTERNAL MEDICINE

## 2022-05-23 NOTE — PROGRESS NOTES
Kaleb Pennington is a 77 year old male who is presenting at the current time to discuss his diagnosi(es) of        Abdominal aortic aneurysm (AAA) without rupture (H)  Tobacco abuse  Impaired fasting glucose  Hyperlipidemia LDL goal <100  Essential hypertension  .              HPI: Kaleb Pennington is a 76 year old male with htn,. HLD, prior aortitis, ATAA, AAA, likely pancreatic IPMN who presents asymptomatically for annual medicare exam and f/u above medical issues as well as a lab and med review after having been lost to f/u for over two years. He has had some homelessness issues and other family issues which caused him to stop taking his meds.      Review Of Systems  Skin: negative  Eyes: negative  Ears/Nose/Throat: negative  Respiratory: No shortness of breath, dyspnea on exertion, cough, or hemoptysis  Cardiovascular: negative  Gastrointestinal: negative  Genitourinary: negative  Musculoskeletal: negative  Neurologic: negative  Psychiatric: negative  Hematologic/Lymphatic/Immunologic: negative  Endocrine: negative          PAST MEDICAL HISTORY:                  Past Medical History:   Diagnosis Date     Hypertension        PAST SURGICAL HISTORY:                  Past Surgical History:   Procedure Laterality Date     ORTHOPEDIC SURGERY         CURRENT MEDICATIONS:                  Current Outpatient Medications   Medication Sig Dispense Refill     alendronate (FOSAMAX) 35 MG tablet Take 1 tablet (35 mg) by mouth every 7 days 13 tablet 3     atorvastatin (LIPITOR) 40 MG tablet Take 1 tablet (40 mg) by mouth daily 90 tablet 3     calcium carbonate 600 mg-vitamin D 400 units (CALTRATE) 600-400 MG-UNIT per tablet Take 1 tablet by mouth 2 times daily 180 tablet 3     FOLIC ACID PO Take 1 mg by mouth daily       losartan (COZAAR) 100 MG tablet Take 1 tablet (100 mg) by mouth daily 90 tablet 3     Methotrexate Sodium (METHOTREXATE PO) Take 6 tablets by mouth once a week       metoprolol tartrate (LOPRESSOR) 25 MG  tablet TAKE 1/2 TABLET BY MOUTH TWICE DAILY 90 tablet 3     order for DME [employer]  Phone #[phone]  Fax #[phone]  Ready To Wear Knee High Compression Stockings  30-40 mmHg  Length of Need: Life Time  # of Pairs 3 3 Device 0     varenicline (CHANTIX) 1 MG tablet Take 1 tablet (1 mg) by mouth 2 times daily 120 tablet 0       ALLERGIES:                  Allergies   Allergen Reactions     Bee Venom Anaphylaxis       SOCIAL HISTORY:                  Social History     Socioeconomic History     Marital status: Single     Spouse name: Not on file     Number of children: Not on file     Years of education: Not on file     Highest education level: Not on file   Occupational History     Not on file   Tobacco Use     Smoking status: Former Smoker     Packs/day: 0.25     Years: 50.00     Pack years: 12.50     Types: Cigarettes     Smokeless tobacco: Never Used   Substance and Sexual Activity     Alcohol use: No     Alcohol/week: 0.0 standard drinks     Drug use: No     Sexual activity: Not Currently     Partners: Female   Other Topics Concern     Parent/sibling w/ CABG, MI or angioplasty before 65F 55M? Not Asked   Social History Narrative     Not on file     Social Determinants of Health     Financial Resource Strain: Not on file   Food Insecurity: Not on file   Transportation Needs: Not on file   Physical Activity: Not on file   Stress: Not on file   Social Connections: Not on file   Intimate Partner Violence: Not on file   Housing Stability: Not on file       FAMILY HISTORY:                   Family History   Problem Relation Age of Onset     Cancer Mother      Cancer Maternal Grandmother      Cancer Sister      Cancer Daughter          Physical exam Reveals:    O/P: WNL  HEENT: WNL  NECK: No JVD, thyromegaly, or lymphadenopathy  HEART: RRR, 2/6 RUSB systolic murmur, no  gallops, no rubs  LUNGS: CTA bilaterally without rales, wheezes, or rhonchi  GI: NABS, nondistended, nontender,  soft  EXT:without cyanosis, clubbing, or edema  NEURO: nonfocal  : no flank tenderness    Component      Latest Ref Rng & Units 12/9/2021 3/17/2022   Sodium      136 - 145 mmol/L 136 142   Potassium      3.5 - 5.0 mmol/L 4.0 4.4   Chloride      98 - 107 mmol/L 104 106   Carbon Dioxide      22 - 31 mmol/L 26 23   Anion Gap      5 - 18 mmol/L 6 13   Urea Nitrogen      8 - 28 mg/dL 14 18   Creatinine      0.70 - 1.30 mg/dL 1.04 1.24   Calcium      8.5 - 10.5 mg/dL 9.4 9.5   Glucose      70 - 125 mg/dL 86 87   Alkaline Phosphatase      45 - 120 U/L 79 83   AST      0 - 40 U/L 18 18   ALT      0 - 45 U/L <9 10   Protein Total      6.0 - 8.0 g/dL 6.4 6.5   Albumin      3.5 - 5.0 g/dL 3.9 3.8   Bilirubin Total      0.0 - 1.0 mg/dL 0.6 0.5   GFR Estimate      >60 mL/min/1.73m2 69 60 (L)   Bilirubin Direct      <=0.5 mg/dL 0.2    Cholesterol      <=199 mg/dL 171 168   Triglycerides      <=149 mg/dL 40 46   HDL Cholesterol      >=40 mg/dL 46 43   LDL Cholesterol Calculated      <=129 mg/dL 117 116   Patient Fasting > 8hrs?       Yes Yes   Microalbumin Urine mg/dL      0.00 - 1.99 mg/dL 3.13 (H) 1.57   Creatinine Urine      mg/dL 189 171   Microalbumin Urine mg/g Cr      <=19.9 mg/g Cr 16.6 9.2   T3 Free      1.6 - 3.9 pg/mL 2.5 2.6   T4 Free      0.70 - 1.80 ng/dL 0.92 0.83   TSH      0.30 - 5.00 uIU/mL 1.69 2.66   Hemoglobin A1C      0.0 - 5.6 % 5.7 (H) 5.4   PSA      0.00 - 6.50 ug/L 0.54 0.57     US AORTA/IVC/ILIAC DUPLEX COMPLETE5/18/2022 10:13 AM      HISTORY: Abdominal aortic aneurysm (AAA) without rupture (H)     COMPARISON: Ultrasound dated 5/30/2017, CT of the abdomen pelvis dated  3/13/2017     FINDINGS:  Evaluation is limited due to calcified plaque and overlying  bowel gas. Again identified is an infrarenal abdominal aortic  aneurysm.  The maximum diameter of the abdominal aorta is 3.9 x 4.0  cm. The common iliac arteries are normal in caliber with no aneurysm.    There is an elevation in peak systolic velocity  in the right common  iliac artery equaling 244 cm/s.                                                                      IMPRESSION:    1.  3.9 x 4.0 cm infrarenal abdominal aortic aneurysm.  2. Elevated velocity in the right common iliac artery could indicate a  focal stenosis.     CARMELITA [place] [address]     Name: SON MANNING  MRN: [id number]  : 1945  Study Date: 2022 11:15 AM  Age: 76 yrs  Gender: Male  Patient Location: [facility]  Reason For Study: Aneurysm of ascending aorta (H)  Ordering Physician: JASON JONES  Referring Physician: JASON JONES  Performed By:      BSA: 2.1 m2  Height: 74 in  Weight: 189 lb  HR: 103  BP: 160/80 mmHg  ______________________________________________________________________________  Procedure  Complete Echo Adult. Definity (NDC #[id number]) given intravenously.  ______________________________________________________________________________  Interpretation Summary     1. The left ventricle is normal in size. Left ventricular function is  decreased. The ejection fraction is 45-50% (mildly reduced). Septal motion is  consistent with LBBB. There is mild concentric left ventricular hypertrophy.  Left ventricular diastolic function is abnormal.  2. Normal right ventricle size and systolic function.  3. The left atrium is moderate to severely dilated.  4. The aortic valve is trileaflet. Moderate aortic valve calcification is  present. Moderate (2+) aortic regurgitation.  Mild valvular aortic stenosis (ROSA:1.5 cm2, Peak javan:2.6 m/sec, DI:0.4, Mean  gradient: 17 mmHg, Svi:36 ml/m2) .  5. The ascending aorta is mildly dilated at 42 mm.     Compared to study on 2016, there is interval reduction in LVEF and  development of mild aortic stenosis and moderate acrotic regurgitation. No  change in ascending aortic  diameter.  ______________________________________________________________________________  Left Ventricle  The left ventricle is normal in size. Left ventricular function is decreased.  The ejection fraction is 45-50% (mildly reduced). There is mild concentric  left ventricular hypertrophy. Left ventricular diastolic function is abnormal.  Septal motion is consistent with conduction abnormality.     Right Ventricle  Normal right ventricle size and systolic function.     Atria  The left atrium is moderate to severely dilated. Right atrial size is normal.     Mitral Valve  Mitral valve leaflets appear normal. There is trace mitral regurgitation.  There is no mitral valve stenosis.     Tricuspid Valve  Tricuspid valve leaflets appear normal. There is no evidence of tricuspid  stenosis or clinically significant tricuspid regurgitation. There is trace  tricuspid regurgitation. Right ventricle systolic pressure estimate normal.  There is no tricuspid stenosis.     Aortic Valve  The aortic valve is trileaflet. Moderate aortic valve calcification is  present. There is moderate (2+) aortic regurgitation. Mild valvular aortic  stenosis.     Pulmonic Valve  The pulmonic valve is not well seen, but is grossly normal. There is trace  pulmonic valvular regurgitation.     Vessels  The aorta root is normal. The ascending aorta is Mildly dilated. IVC diameter  <2.1 cm collapsing >50% with sniff suggests a normal RA pressure of 3 mmHg.     Pericardium  There is no pericardial effusion.     Rhythm  Sinus rhythm was noted.  ______________________________________________________________________________  MMode/2D Measurements & Calculations  IVSd: 1.3 cm  IVSs: 1.3 cm     LVIDd: 4.7 cm  LVIDs: 3.4 cm  LVPWd: 1.2 cm  FS: 27.3 %  LV mass(C)d: 218.7 grams  LV mass(C)dI: 103.1 grams/m2  Ao root diam: 3.7 cm  LA dimension: 3.3 cm  asc Aorta Diam: 4.2 cm  LA/Ao: 0.90  LVOT diam: 2.1 cm  LVOT area: 3.5 cm2  LA Volume Indexed (AL/bp): 26.6  ml/m2  RWT: 0.50     Doppler Measurements & Calculations  MV E max mt: 60.7 cm/sec  MV A max mt: 109.0 cm/sec  MV E/A: 0.56  MV dec time: 0.28 sec  Ao V2 max: 261.0 cm/sec  Ao max P.0 mmHg  Ao V2 mean: 198.0 cm/sec  Ao mean P.0 mmHg  Ao V2 VTI: 58.3 cm  ROSA(I,D): 1.3 cm2  ROSA(V,D): 1.5 cm2  LV V1 max P.1 mmHg  LV V1 max: 113.0 cm/sec  LV V1 VTI: 21.8 cm  SV(LVOT): 75.5 ml  SI(LVOT): 35.6 ml/m2  PA acc time: 0.10 sec  AV Mt Ratio (DI): 0.43  ROSA Index (cm2/m2): 0.61  E/E' av.1  Lateral E/e': 16.4  Medial E/e': 11.7     ______________________________________________________________________________  Report approved by: Veronika Hayward 2022 01:11 PM      A/P:         (E78.5) Hyperlipidemia LDL goal <100  Comment: not at goal due to med noncompliance  Plan: atorvastatin (LIPITOR) 40 MG tablet, losartan         (COZAAR) 100 MG tablet             (R73.01) Impaired fasting glucose  Comment: avoid CHO  Plan: monitor A1C     (I10) Essential hypertension  Comment: not at goal due to med noncompliance  Plan: losartan (COZAAR) 100 MG tablet, metoprolol         tartrate (LOPRESSOR) 25 MG tablet        RTC 6 weeks for BP check after turning in cardiac event monitor     (I71.2) 42 mm Ascending aortic aneurysm (H)  Comment: asx  Plan: CTA C/A/P in one year     (Z72.0) Tobacco abuse  Comment: he has quit  Plan: he is congratulated        (K86.89) Pancreatic mass  Comment: this is a likely IPMN; he has had hepatobiliary f/u of this  Plan: discuss with patient when seen next whether he has had hepatobiliary f/u of this, if not coordinate seeing [place] [facility]           (I71.4) Abdominal aortic aneurysm (AAA) without rupture (H)  Comment: lost to f/u  Plan: US Abdominal Aorta Imaging in three months, RTC two weeks later to discuss     (I35.0) Nonrheumatic aortic valve stenosis  Comment: no murmur, mild  Plan: check TTE in one year       H/O aortitis  Pt advised to f/u with rheum.       (I35.1) Moderate aortic  regurgitation  Comment: asx  Plan: repeat TTE in one year    (I35.0) Mild aortic stenosis  Comment: asx  Plan: repeat TTE in one year    (I51.7) Left atrial dilation  Comment: unknown if he goes in and out of AF; EKG today reveals SR but with a previously unnoted AS  MI  Plan: Adult Cardiac Event Monitor        RTC six weeks.    (I42.9) Cardiomyopathy, unspecified type, with decreased LV function  Comment: this is new, rule out ischemic CMO  Plan: NM Lexiscan stress test        RTC six weeks    53 minutes total medical care on today's date

## 2022-05-23 NOTE — PROGRESS NOTES
Patient is here to discuss follow up.    /66 (BP Location: Right arm, Patient Position: Chair, Cuff Size: Adult Large)   Pulse 68   SpO2 100%     Questions patient would like addressed today are: N/A.    Refills are needed: No    Has homecare services and agency name:  Geraldine Morris
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
## 2022-05-25 ENCOUNTER — TELEPHONE (OUTPATIENT)
Dept: OTHER | Facility: CLINIC | Age: 77
End: 2022-05-25
Payer: MEDICARE

## 2022-05-25 NOTE — TELEPHONE ENCOUNTER
DANYELLE for patient as reminder to call Cardiac Scheduling to schedule the Lexiscan and the 30-day event monitor that Dr. Henry ordered.  The monitor needs to be on by 6/13/22 in order to schedule the follow up with Dr. Henry in 7-8 weeks (July 18) weeks to go over the results.    Gold sheet in Ana's office

## 2022-05-31 NOTE — TELEPHONE ENCOUNTER
LM for patient to call to help him get scheduled for his appointments.      DANYELLE on phone #'s [phone] and [phone].    Unable to LM on [phone] as that is a fax#

## 2022-05-31 NOTE — TELEPHONE ENCOUNTER
Who is the name of the provider?:   Elaine     What is the location you see this provider at?:  [place]        Reason for call:  Patient requesting to speak with Ana or Gadiel for scheduling. Offered him to schedule his appointment but he declined .     Contact name: Ed      Contact number: [phone]

## 2022-05-31 NOTE — TELEPHONE ENCOUNTER
Spoke with patient.  He said he has too much going on right now to schedule an appointment.  He will call back the beginning of next week to schedule the appointments.  I did stress that it is time sensitive.  He stated he can't take on anything additional at present as he is dealing on an issue with his 's license.

## 2022-06-01 ENCOUNTER — MEDICAL CORRESPONDENCE (OUTPATIENT)
Dept: HEALTH INFORMATION MANAGEMENT | Facility: CLINIC | Age: 77
End: 2022-06-01
Payer: MEDICARE

## 2022-06-01 NOTE — TELEPHONE ENCOUNTER
I filled this form out and gave it to the patietn at his last visit with me. If he won't schedule a Lexiscan, than that is his choice.

## 2022-06-01 NOTE — TELEPHONE ENCOUNTER
[facility]    Fax from [place] [employer] with concerns for appropriateness of renewal of Edward's 's license on Dr. Henry's desk for review.

## 2022-06-01 NOTE — TELEPHONE ENCOUNTER
Documentation requested for [place] [employer] faxed back to patient at [phone].    (including letter drafted by Dr. Henry 06/01/22)    Rightfax 06/01/22 2:21.

## 2022-07-07 NOTE — TELEPHONE ENCOUNTER
Patient left a voicemail today about setting up an appointment with regards to paperwork for license renewal. I didn't see any follow up visits for this in the short term except for the un-linked OV already completed 5/23. FYI.

## 2022-07-07 NOTE — TELEPHONE ENCOUNTER
Patient called back and I gave him contact info for [facility] and [facility].

## 2022-07-08 NOTE — TELEPHONE ENCOUNTER
Please read the notes below in this encounter. Patient needs several things scheduled   (cardiac event monitor, NM lexiscan) and then to see Dr. Henry 2 weeks after those things are completed. If he refuses document it and put it in the appt note he refused.     Kacie HUIZAR, RN    [facility]  Office: [phone]  Fax: [phone]

## 2022-07-08 NOTE — TELEPHONE ENCOUNTER
Future Appointments   Date Time Provider Department Center   7/27/2022  8:30 AM [facility]2 [facility]   7/27/2022 12:45 PM WW  ECHO STAFF [facility]TS [facility]   7/27/2022  1:45 PM [facility] MON [facility]   9/7/2022 10:40 AM Christopher Henry MD [facility]

## 2022-07-08 NOTE — TELEPHONE ENCOUNTER
Left voicemail for patient on [phone] instructing him to call [facility] to set up his cardiac event monitor + NM lexiscan

## 2022-07-27 ENCOUNTER — HOSPITAL ENCOUNTER (OUTPATIENT)
Dept: CARDIOLOGY | Facility: CLINIC | Age: 77
Discharge: HOME | End: 2022-07-27
Attending: INTERNAL MEDICINE
Payer: MEDICARE

## 2022-07-27 ENCOUNTER — HOSPITAL ENCOUNTER (OUTPATIENT)
Dept: NUCLEAR MEDICINE | Facility: CLINIC | Age: 77
Discharge: HOME | End: 2022-07-27
Attending: INTERNAL MEDICINE
Payer: MEDICARE

## 2022-07-27 DIAGNOSIS — I42.9 CARDIOMYOPATHY, UNSPECIFIED TYPE (H): ICD-10-CM

## 2022-07-27 DIAGNOSIS — I35.1 MODERATE AORTIC REGURGITATION: ICD-10-CM

## 2022-07-27 DIAGNOSIS — I35.0 MILD AORTIC STENOSIS: ICD-10-CM

## 2022-07-27 DIAGNOSIS — I51.7 LEFT ATRIAL DILATION: ICD-10-CM

## 2022-07-27 DIAGNOSIS — I51.9 DECREASED LEFT VENTRICULAR SYSTOLIC FUNCTION: ICD-10-CM

## 2022-07-27 LAB
CV BLOOD PRESSURE: 56 MMHG
CV STRESS CURRENT BP HE: NORMAL
CV STRESS CURRENT HR HE: 55
CV STRESS CURRENT HR HE: 59
CV STRESS CURRENT HR HE: 62
CV STRESS CURRENT HR HE: 65
CV STRESS CURRENT HR HE: 66
CV STRESS CURRENT HR HE: 68
CV STRESS CURRENT HR HE: 69
CV STRESS CURRENT HR HE: 69
CV STRESS CURRENT HR HE: 71
CV STRESS CURRENT HR HE: 71
CV STRESS CURRENT HR HE: 72
CV STRESS CURRENT HR HE: 72
CV STRESS CURRENT HR HE: 75
CV STRESS CURRENT HR HE: 77
CV STRESS DEVIATION TIME HE: NORMAL
CV STRESS ECHO PERCENT HR HE: NORMAL
CV STRESS EXERCISE STAGE HE: NORMAL
CV STRESS FINAL RESTING BP HE: NORMAL
CV STRESS FINAL RESTING HR HE: 68
CV STRESS MAX HR HE: 77
CV STRESS MAX TREADMILL GRADE HE: 0
CV STRESS MAX TREADMILL SPEED HE: 0
CV STRESS PEAK DIA BP HE: NORMAL
CV STRESS PEAK SYS BP HE: NORMAL
CV STRESS PHASE HE: NORMAL
CV STRESS PROTOCOL HE: NORMAL
CV STRESS RESTING PT POSITION HE: NORMAL
CV STRESS ST DEVIATION AMOUNT HE: NORMAL
CV STRESS ST DEVIATION ELEVATION HE: NORMAL
CV STRESS ST EVELATION AMOUNT HE: NORMAL
CV STRESS TEST TYPE HE: NORMAL
CV STRESS TOTAL STAGE TIME MIN 1 HE: NORMAL
LVEF ECHO: NORMAL
RATE PRESSURE PRODUCT: 6314
STRESS ECHO BASELINE DIASTOLIC HE: 62
STRESS ECHO BASELINE HR: 61
STRESS ECHO BASELINE SYSTOLIC BP: 143
STRESS ECHO CALCULATED PERCENT HR: 54 %
STRESS ECHO LAST STRESS DIASTOLIC BP: 42
STRESS ECHO LAST STRESS HR: 72
STRESS ECHO LAST STRESS SYSTOLIC BP: 82
STRESS ECHO TARGET HR: 142

## 2022-07-27 PROCEDURE — 250N000011 HC RX IP 250 OP 636: Performed by: INTERNAL MEDICINE

## 2022-07-27 PROCEDURE — 93270 REMOTE 30 DAY ECG REV/REPORT: CPT

## 2022-07-27 PROCEDURE — 343N000001 HC RX 343: Performed by: INTERNAL MEDICINE

## 2022-07-27 PROCEDURE — 93018 CV STRESS TEST I&R ONLY: CPT | Performed by: INTERNAL MEDICINE

## 2022-07-27 PROCEDURE — A9500 TC99M SESTAMIBI: HCPCS | Performed by: INTERNAL MEDICINE

## 2022-07-27 PROCEDURE — 93017 CV STRESS TEST TRACING ONLY: CPT

## 2022-07-27 PROCEDURE — 93306 TTE W/DOPPLER COMPLETE: CPT | Mod: 26 | Performed by: INTERNAL MEDICINE

## 2022-07-27 PROCEDURE — G1004 CDSM NDSC: HCPCS | Performed by: INTERNAL MEDICINE

## 2022-07-27 PROCEDURE — 255N000002 HC RX 255 OP 636: Performed by: INTERNAL MEDICINE

## 2022-07-27 PROCEDURE — 78452 HT MUSCLE IMAGE SPECT MULT: CPT | Mod: 26 | Performed by: INTERNAL MEDICINE

## 2022-07-27 PROCEDURE — G1004 CDSM NDSC: HCPCS

## 2022-07-27 PROCEDURE — 93016 CV STRESS TEST SUPVJ ONLY: CPT | Performed by: INTERNAL MEDICINE

## 2022-07-27 PROCEDURE — 999N000208 ECHOCARDIOGRAM COMPLETE

## 2022-07-27 RX ORDER — ALBUTEROL SULFATE 0.83 MG/ML
2.5 SOLUTION RESPIRATORY (INHALATION)
Status: DISCONTINUED | OUTPATIENT
Start: 2022-07-27 | End: 2022-07-27 | Stop reason: HOSPADM

## 2022-07-27 RX ORDER — CAFFEINE 200 MG
200 TABLET ORAL
Status: DISCONTINUED | OUTPATIENT
Start: 2022-07-27 | End: 2022-07-27 | Stop reason: HOSPADM

## 2022-07-27 RX ORDER — AMINOPHYLLINE 25 MG/ML
50 INJECTION, SOLUTION INTRAVENOUS
Status: DISCONTINUED | OUTPATIENT
Start: 2022-07-27 | End: 2022-07-27 | Stop reason: HOSPADM

## 2022-07-27 RX ORDER — CAFFEINE CITRATE 20 MG/ML
60 SOLUTION INTRAVENOUS
Status: DISCONTINUED | OUTPATIENT
Start: 2022-07-27 | End: 2022-07-27 | Stop reason: HOSPADM

## 2022-07-27 RX ORDER — LOSARTAN POTASSIUM 100 MG/1
100 TABLET ORAL DAILY
COMMUNITY
Start: 2022-01-03 | End: 2022-07-27

## 2022-07-27 RX ORDER — REGADENOSON 0.08 MG/ML
0.4 INJECTION, SOLUTION INTRAVENOUS ONCE
Status: COMPLETED | OUTPATIENT
Start: 2022-07-27 | End: 2022-07-27

## 2022-07-27 RX ADMIN — Medication 33 MILLICURIE: at 09:12

## 2022-07-27 RX ADMIN — Medication 8.7 MILLICURIE: at 08:39

## 2022-07-27 RX ADMIN — REGADENOSON 0.4 MG: 0.08 INJECTION, SOLUTION INTRAVENOUS at 09:12

## 2022-07-27 RX ADMIN — PERFLUTREN 1.5 ML: 6.52 INJECTION, SUSPENSION INTRAVENOUS at 10:30

## 2022-08-09 ENCOUNTER — HOSPITAL ENCOUNTER (OUTPATIENT)
Dept: CARDIOLOGY | Facility: CLINIC | Age: 77
Discharge: HOME | End: 2022-08-09
Attending: INTERNAL MEDICINE
Payer: MEDICARE

## 2022-08-16 NOTE — TELEPHONE ENCOUNTER
[facility]    Who is the provider?:  Elaine      Preferred provider location: [place]    Person calling: Zander Pennington  Call back phone: [phone]       Nurse call back needed: NO          Can we leave a message?  YES        Reason for call:    Patient called and stated he never got a call from [facility] per referral from Dr. Henry.    Patient also stated that he took off his event monitor, cardiology gave him a new monitor but it never got installed.    Patients visit probably needs to get moved again per event monitor.    Please advise next steps.    Outside imaging: n/a    Name / Loc of pharmacy: n/a

## 2022-08-17 NOTE — TELEPHONE ENCOUNTER
[address]   [place] [address]   Phone: [phone] FAX [phone]     Cardiac Event Monitor:  [phone].    Referral has been placed to [facility] ([facility]).      Appointment on 9/7/22 with Dr. Henry has been cancelled.      Called patient to request that he:    1. Calls cardiology (number provided above) and arrange for cardiac event monitor to be completed  2. Calls [facility] to arrange for his consult (number above)  3. Reschedules appointment with Dr. Henry for a minimum of 40 days after the cardiac event monitor is placed.       Patient verbalized understanding.

## 2022-09-01 ENCOUNTER — TELEPHONE (OUTPATIENT)
Dept: OTHER | Facility: CLINIC | Age: 77
End: 2022-09-01

## 2022-09-01 NOTE — TELEPHONE ENCOUNTER
[facility]    Who is the name of the provider?:  Elaine      What is the location you see this provider at?: [place]    Reason for call:  Re status of referral, after attempting to reach patient several times patient states he does not want to be contacted further to schedule this procedure.     Can we leave a detailed message on this number?  YES

## 2022-09-02 NOTE — TELEPHONE ENCOUNTER
It is up to the patient if he chooses to see them.  This encounter will serve as the note in the chart he is not planning to.    Kacie HUIZAR RN    [facility]  Office: [phone]  Fax: [phone]

## 2022-09-06 ENCOUNTER — TELEPHONE (OUTPATIENT)
Dept: OTHER | Facility: CLINIC | Age: 77
End: 2022-09-06

## 2022-09-06 NOTE — TELEPHONE ENCOUNTER
Patient calling to set up visit with [facility]. He will call back to set up his follow up with Elaine pending an appt there. Please schedule his follow up once he calls back.

## 2022-09-06 NOTE — TELEPHONE ENCOUNTER
----- Message from Christopher Henry MD sent at 9/6/2022 12:41 PM CDT -----  Please schedule f/u appt.

## 2022-09-09 NOTE — TELEPHONE ENCOUNTER
Patient wondering if he can be referred to a provider at [facility] for the requested pre-op exam.     Separately, he stated that he has a consult with [facility] on 10/4

## 2022-09-09 NOTE — TELEPHONE ENCOUNTER
I called Kaleb and explained he does not need a referral if he wants to establish care with a new PCP at [facility].   He is unsure if he wants to drive to [place] for a pre-op with Dr. Henry. We discussed he would need pre-op labs prior to the visit if he wants to see Dr. Henry. He will call back Monday if he chooses to schedule the pre- op with Dr. Henry.     Kacie HUIZAR, RN    [facility]  [facility]  Office: [phone]  Fax: [phone]

## 2022-09-21 ENCOUNTER — TRANSFERRED RECORDS (OUTPATIENT)
Dept: HEALTH INFORMATION MANAGEMENT | Facility: CLINIC | Age: 77
End: 2022-09-21

## 2022-09-26 ENCOUNTER — OFFICE VISIT (OUTPATIENT)
Dept: FAMILY MEDICINE | Facility: CLINIC | Age: 77
End: 2022-09-26
Payer: MEDICARE

## 2022-09-26 VITALS
HEART RATE: 74 BPM | HEIGHT: 74 IN | DIASTOLIC BLOOD PRESSURE: 68 MMHG | SYSTOLIC BLOOD PRESSURE: 122 MMHG | WEIGHT: 155 LBS | BODY MASS INDEX: 19.89 KG/M2

## 2022-09-26 DIAGNOSIS — I10 BENIGN ESSENTIAL HYPERTENSION: ICD-10-CM

## 2022-09-26 DIAGNOSIS — Z01.818 PREOP EXAMINATION: ICD-10-CM

## 2022-09-26 DIAGNOSIS — Z11.59 NEED FOR HEPATITIS C SCREENING TEST: Primary | ICD-10-CM

## 2022-09-26 DIAGNOSIS — H61.23 BILATERAL IMPACTED CERUMEN: ICD-10-CM

## 2022-09-26 PROBLEM — I71.40 ABDOMINAL AORTIC ANEURYSM (AAA) (H): Status: ACTIVE | Noted: 2017-01-13

## 2022-09-26 PROBLEM — I77.6 VASCULITIS (H): Status: ACTIVE | Noted: 2017-03-13

## 2022-09-26 PROCEDURE — 99204 OFFICE O/P NEW MOD 45 MIN: CPT | Mod: 25 | Performed by: FAMILY MEDICINE

## 2022-09-26 PROCEDURE — 69209 REMOVE IMPACTED EAR WAX UNI: CPT | Mod: 50 | Performed by: FAMILY MEDICINE

## 2022-09-26 NOTE — PROGRESS NOTES
[facility]  [address] [place] [id number]  Phone: [phone]  Fax: [phone]  Primary Provider: Christopher Henry  Pre-op Performing Provider: EDIN BUSH       PREOPERATIVE EVALUATION:  Today's date: 9/26/2022    Kaleb Pennington is a 77 year old male who presents for a preoperative evaluation.    Surgical Information:  Surgery/Procedure: Endoscopy, Ultrasound   Surgery Location: [place]  Surgeon:   Surgery Date: 10/4/22  Time of Surgery: TBD  Where patient plans to recover: At home with family  Fax number for surgical facility: Note does not need to be faxed, will be available electronically in Epic.    Type of Anesthesia Anticipated: Local with MAC    Assessment & Plan     The proposed surgical procedure is considered LOW risk.      (Z01.818) Preop examination  Comment: For procedure as above for dilated pancreatic duct and the pancreatic cyst  Plan:      (I10) Benign essential hypertension  Comment: Well-controlled  Plan:      (H61.23) Bilateral impacted cerumen  Comment: Both ear canals affected  Plan:      PLAN:  1.  Bilateral cerumen gnosis removed by clinical assistant with usual method of water and hydrogen peroxide irrigation  2.  Laboratory studies from September 19, 2022 reviewed, including a CBC comprehensive metabolic profile, essentially within normal limits.  3.  Patient is otherwise cleared for surgery     Medication Instructions:  Patient is to take all scheduled medications on the day of surgery    RECOMMENDATION:  APPROVAL GIVEN to proceed with proposed procedure, without further diagnostic evaluation.        Subjective     HPI related to upcoming procedure: Patient comes in for preoperative clearance prior to an endoscopic ultrasound.  A pancreatic cyst was noted on a recent MRI and concerned about the enlargement of the pancreatic duct he is therefore scheduled for the procedure as above for further clarification.        Preoperative Review of :   reviewed - no record of controlled substances prescribed.       Status of Chronic Conditions:  See problem list for active medical problems.  Problems all longstanding and stable, except as noted/documented.  See ROS for pertinent symptoms related to these conditions.      Review of Systems  CONSTITUTIONAL: NEGATIVE for fever, chills, change in weight  INTEGUMENTARY/SKIN: NEGATIVE for worrisome rashes, moles or lesions  EYES: NEGATIVE for vision changes or irritation  ENT/MOUTH: NEGATIVE for ear, mouth and throat problems  RESP: NEGATIVE for significant cough or SOB  CV: NEGATIVE for chest pain, palpitations or peripheral edema  GI: NEGATIVE for nausea, abdominal pain, heartburn, or change in bowel habits  : NEGATIVE for frequency, dysuria, or hematuria  MUSCULOSKELETAL: NEGATIVE for significant arthralgias or myalgia  NEURO: NEGATIVE for weakness, dizziness or paresthesias  ENDOCRINE: NEGATIVE for temperature intolerance, skin/hair changes  HEME: NEGATIVE for bleeding problems  PSYCHIATRIC: NEGATIVE for changes in mood or affect    Patient Active Problem List    Diagnosis Date Noted     Venous ulcer of ankle, right (H) 07/25/2018     Priority: Medium      Past Medical History:   Diagnosis Date     Hypertension      Past Surgical History:   Procedure Laterality Date     ORTHOPEDIC SURGERY       Current Outpatient Medications   Medication Sig Dispense Refill     alendronate (FOSAMAX) 35 MG tablet Take 1 tablet (35 mg) by mouth every 7 days 13 tablet 3     atorvastatin (LIPITOR) 40 MG tablet Take 1 tablet (40 mg) by mouth daily 90 tablet 3     calcium carbonate 600 mg-vitamin D 400 units (CALTRATE) 600-400 MG-UNIT per tablet Take 1 tablet by mouth 2 times daily 180 tablet 3     losartan (COZAAR) 100 MG tablet Take 1 tablet (100 mg) by mouth daily 90 tablet 3     metoprolol tartrate (LOPRESSOR) 25 MG tablet TAKE 1/2 TABLET BY MOUTH TWICE DAILY 90 tablet 3     order for DME [employer]  "Compression Stockings  Phone #[phone]  Fax #[phone]  Ready To Wear Knee High Compression Stockings  30-40 mmHg  Length of Need: Life Time  # of Pairs 3 3 Device 0     varenicline (CHANTIX) 1 MG tablet Take 1 tablet (1 mg) by mouth 2 times daily 120 tablet 0       Allergies   Allergen Reactions     Bee Venom Anaphylaxis        Social History     Tobacco Use     Smoking status: Former Smoker     Packs/day: 0.25     Years: 50.00     Pack years: 12.50     Types: Cigarettes     Smokeless tobacco: Never Used   Substance Use Topics     Alcohol use: No     Alcohol/week: 0.0 standard drinks     Family History   Problem Relation Age of Onset     Cancer Mother      Cancer Sister      Cancer Maternal Grandmother      Cancer Daughter      History   Drug Use No         Objective     /68 (BP Location: Right arm, Patient Position: Sitting, Cuff Size: Adult Regular)   Pulse 74   Ht 1.88 m (6' 2\")   Wt 70.3 kg (155 lb)   BMI 19.90 kg/m      Physical Exam    GENERAL APPEARANCE: healthy, alert and no distress     EYES: EOMI,  PERRL     HENT: ear canals and TM's normal and nose and mouth without ulcers or lesions     NECK: no adenopathy, no asymmetry, masses, or scars and thyroid normal to palpation     RESP: lungs clear to auscultation - no rales, rhonchi or wheezes     CV: regular rates and rhythm, normal S1 S2, no S3 or S4 and no murmur, click or rub     ABDOMEN:  soft, nontender, no HSM or masses and bowel sounds normal     MS: extremities normal- no gross deformities noted, no evidence of inflammation in joints, FROM in all extremities.     SKIN: no suspicious lesions or rashes     NEURO: Normal strength and tone, sensory exam grossly normal, mentation intact and speech normal     PSYCH: mentation appears normal. and affect normal/bright     LYMPHATICS: No cervical adenopathy    Recent Labs   Lab Test 03/17/22  0826 12/09/21  0942    136   POTASSIUM 4.4 4.0   CR 1.24 1.04   A1C 5.4 5.7*    "     Diagnostics:  No results found for this or any previous visit (from the past 240 hour(s)).   No EKG required for low risk surgery (cataract, skin procedure, breast biopsy, etc).  No EKG required, no history of coronary heart disease, significant arrhythmia, peripheral arterial disease or other structural heart disease.    Revised Cardiac Risk Index (RCRI):  The patient has the following serious cardiovascular risks for perioperative complications:   - No serious cardiac risks = 0 points     RCRI Interpretation: 0 points: Class I (very low risk - 0.4% complication rate)           Signed Electronically by: Brandyn Schwartz MD  Copy of this evaluation report is provided to requesting physician.

## 2022-10-19 ENCOUNTER — TELEPHONE (OUTPATIENT)
Dept: OTHER | Facility: CLINIC | Age: 77
End: 2022-10-19

## 2022-10-19 NOTE — TELEPHONE ENCOUNTER
[facility]ULAR [facility]    Who is the name of the provider?:  [place]aine    What is the location you see this provider at/preferred location?: [place]  Person calling: Self  Phone number:  [phone]    Nurse call back needed:  YES     Reason for call:   Patient asking if he is healthy enough to have hip replacement.      Pharmacy location:  NA  Outside Imaging: Not Applicable   Can we leave a detailed message on this number?  YES

## 2022-10-19 NOTE — TELEPHONE ENCOUNTER
Jere contact patient to arrange for 60 minute appointment to discuss if he is healthy enough for a hip replacement.    Next available.

## 2022-10-19 NOTE — TELEPHONE ENCOUNTER
Patient was contacted 9/6/22 to arrange an appointment to discuss the results of his cardiac event monitor.  He has not been seen since that time.   He had a pre-operative examination by Dr. Schwartz at [facility] prior to his endoscopy.     Routing to Dr. Henry to determine if patient should be seen to discuss possible hip replacement surgery or if he should be directed to consult the physician (Dr. Schwartz) who performed his pre-procedural exam on 9/26/22.

## 2022-10-20 NOTE — TELEPHONE ENCOUNTER
Spoke with patient and he states he is having another procedure done at [facility] so would like to wait until 11/10/22 for appointment.

## 2022-10-25 DIAGNOSIS — Z72.0 TOBACCO ABUSE: ICD-10-CM

## 2022-10-25 RX ORDER — VARENICLINE TARTRATE 1 MG/1
1 TABLET, FILM COATED ORAL 2 TIMES DAILY
Qty: 120 TABLET | Refills: 0 | Status: SHIPPED | OUTPATIENT
Start: 2022-10-25

## 2022-10-25 NOTE — TELEPHONE ENCOUNTER
"varenicline (CHANTIX) 1 MG tablet  Last Written Prescription Date:  4/11/22  Last Fill Quantity: 120,  # refills: 0     Last office visit: 5/23/2022     Future Office Visit:   Next 5 appointments (look out 90 days)    Nov 10, 2022 12:10 PM  Return Visit with Christopher Henry MD  [facility] [place] ([facility] ) [address] [facility]  [place] [id number] [phone]         Requested Prescriptions   Pending Prescriptions Disp Refills     varenicline (CHANTIX) 1 MG tablet [Pharmacy Med Name: Varenicline Tartrate Oral Tablet 1 MG] 120 tablet 0     Sig: Take 1 tablet (1 mg) by mouth 2 times daily       Partial Cholinergic Nicotinic Agonist Agents Passed - 10/25/2022 11:56 AM        Passed - Blood pressure under 140/90 in past 12 months     BP Readings from Last 3 Encounters:   09/26/22 122/68   05/23/22 130/66   12/24/21 (!) 178/76                 Passed - Recent (12 mo) or future (30 days) visit within the authorizing provider's specialty     Patient has had an office visit with the authorizing provider or a provider within the authorizing providers department within the previous 12 mos or has a future within next 30 days. See \"Patient Info\" tab in inbasket, or \"Choose Columns\" in Meds & Orders section of the refill encounter.              Passed - Medication is active on med list        Passed - Patient is 18 years of age or older           Prescription approved per [facility] Refill Protocol.      "

## 2022-11-03 ENCOUNTER — TELEPHONE (OUTPATIENT)
Dept: OTHER | Facility: CLINIC | Age: 77
End: 2022-11-03

## 2022-11-03 DIAGNOSIS — I87.2 CHRONIC VENOUS INSUFFICIENCY: ICD-10-CM

## 2022-11-03 DIAGNOSIS — I87.2 VENOUS (PERIPHERAL) INSUFFICIENCY: Primary | ICD-10-CM

## 2022-11-03 DIAGNOSIS — S91.001A WOUND OF RIGHT ANKLE: ICD-10-CM

## 2022-11-03 NOTE — TELEPHONE ENCOUNTER
Returned patient's call.    Patient needs compression stockings order faxed.    Per Dr. Henry's note:          Routing to Deedee Cross PA-C to sign Rx.    Will fax to [phone] ([facility])     Rosibel Corado RN BSN  [facility]  [phone]

## 2022-11-03 NOTE — TELEPHONE ENCOUNTER
[facility]    Who is the name of the provider?:  Elaine    What is the location you see this provider at/preferred location?: [place]  Person calling: Zander Pennington  Phone number: [phone]  Nurse call back needed:  YES     Reason for call:   Patient needs help with a compression stocking prescription request. Please call him back.    Also asking that any prescriptions be faxed to [phone] ([facility])     Pharmacy location:  n/a  Outside Imaging: Not Applicable   Can we leave a detailed message on this number?  YES

## 2022-11-09 DIAGNOSIS — M85.80 OSTEOPENIA, UNSPECIFIED LOCATION: ICD-10-CM

## 2022-11-10 NOTE — TELEPHONE ENCOUNTER
Patient called to cancel his 11/10/22 appointment with Dr Henry. Pt has not had his endoscopy yet and will call tomorrow (11/11/22) to get that scheduled. Once he has his endoscopy scheduled he will call to reschedule his Dr Henry 60 minute appointment.  He is scheduled for his Pre-op on 11/21/22.

## 2023-01-29 DIAGNOSIS — E78.5 HYPERLIPIDEMIA LDL GOAL <100: ICD-10-CM

## 2023-01-29 DIAGNOSIS — I10 ESSENTIAL HYPERTENSION: ICD-10-CM

## 2023-01-30 RX ORDER — METOPROLOL TARTRATE 25 MG/1
TABLET, FILM COATED ORAL
Qty: 90 TABLET | Refills: 1 | Status: SHIPPED | OUTPATIENT
Start: 2023-01-30

## 2023-01-30 RX ORDER — LOSARTAN POTASSIUM 100 MG/1
100 TABLET ORAL DAILY
Qty: 90 TABLET | Refills: 1 | Status: SHIPPED | OUTPATIENT
Start: 2023-01-30

## 2023-01-30 NOTE — TELEPHONE ENCOUNTER
"losartan (COZAAR) 100 MG tablet  Last Written Prescription Date:  12/24/21  Last Fill Quantity: 90,  # refills: 3     metoprolol tartrate (LOPRESSOR) 25 MG tablet  Last Written Prescription Date:  12/24/21  Last Fill Quantity: 90,  # refills: 3     Last office visit:  5/23/22      Requested Prescriptions   Pending Prescriptions Disp Refills     metoprolol tartrate (LOPRESSOR) 25 MG tablet [Pharmacy Med Name: Metoprolol Tartrate Oral Tablet 25 MG] 90 tablet 0     Sig: TAKE one-half TABLET BY MOUTH TWICE DAILY       Beta-Blockers Protocol Passed - 1/29/2023  1:52 PM        Passed - Blood pressure under 140/90 in past 12 months     BP Readings from Last 3 Encounters:   09/26/22 122/68   05/23/22 130/66   12/24/21 (!) 178/76                 Passed - Patient is age 6 or older        Passed - Recent (12 mo) or future (30 days) visit within the authorizing provider's specialty     Patient has had an office visit with the authorizing provider or a provider within the authorizing providers department within the previous 12 mos or has a future within next 30 days. See \"Patient Info\" tab in inbasket, or \"Choose Columns\" in Meds & Orders section of the refill encounter.              Passed - Medication is active on med list           losartan (COZAAR) 100 MG tablet [Pharmacy Med Name: Losartan Potassium Oral Tablet 100 MG] 90 tablet 0     Sig: Take 1 tablet (100 mg) by mouth daily       Angiotensin-II Receptors Passed - 1/29/2023  1:52 PM        Passed - Last blood pressure under 140/90 in past 12 months     BP Readings from Last 3 Encounters:   09/26/22 122/68   05/23/22 130/66   12/24/21 (!) 178/76                 Passed - Recent (12 mo) or future (30 days) visit within the authorizing provider's specialty     Patient has had an office visit with the authorizing provider or a provider within the authorizing providers department within the previous 12 mos or has a future within next 30 days. See \"Patient Info\" tab in inbasket, " "or \"Choose Columns\" in Meds & Orders section of the refill encounter.              Passed - Medication is active on med list        Passed - Patient is age 18 or older        Passed - Normal serum creatinine on file in past 12 months     Recent Labs   Lab Test 03/17/22  0826 04/17/17  1045 03/03/17  1330   CR 1.24   < >  --    CREAT  --   --  1.0    < > = values in this interval not displayed.       Ok to refill medication if creatinine is low          Passed - Normal serum potassium on file in past 12 months     Recent Labs   Lab Test 03/17/22  0826   POTASSIUM 4.4                       Prescription approved per [facility] Refill Protocol.        "

## 2024-01-01 ENCOUNTER — TRANSFERRED RECORDS (OUTPATIENT)
Dept: HEALTH INFORMATION MANAGEMENT | Facility: CLINIC | Age: 79
End: 2024-01-01
Payer: MEDICARE